# Patient Record
Sex: MALE | URBAN - METROPOLITAN AREA
[De-identification: names, ages, dates, MRNs, and addresses within clinical notes are randomized per-mention and may not be internally consistent; named-entity substitution may affect disease eponyms.]

---

## 2019-01-11 ENCOUNTER — RECORDS - HEALTHEAST (OUTPATIENT)
Dept: LAB | Facility: CLINIC | Age: 69
End: 2019-01-11

## 2019-01-11 ENCOUNTER — TRANSFERRED RECORDS (OUTPATIENT)
Dept: HEALTH INFORMATION MANAGEMENT | Facility: CLINIC | Age: 69
End: 2019-01-11

## 2019-01-11 LAB
INR PPP: 1.88 (ref 0.9–1.1)
INR PPP: 1.88 (ref 0.9–1.1)

## 2019-01-14 ENCOUNTER — NURSING HOME VISIT (OUTPATIENT)
Dept: GERIATRICS | Facility: CLINIC | Age: 69
End: 2019-01-14
Payer: MEDICARE

## 2019-01-14 VITALS
SYSTOLIC BLOOD PRESSURE: 97 MMHG | HEIGHT: 70 IN | OXYGEN SATURATION: 93 % | DIASTOLIC BLOOD PRESSURE: 53 MMHG | RESPIRATION RATE: 18 BRPM | HEART RATE: 90 BPM | TEMPERATURE: 99 F

## 2019-01-14 DIAGNOSIS — E88.3 TUMOR LYSIS SYNDROME: ICD-10-CM

## 2019-01-14 DIAGNOSIS — E83.42 HYPOMAGNESEMIA: ICD-10-CM

## 2019-01-14 DIAGNOSIS — R53.81 DEBILITY: ICD-10-CM

## 2019-01-14 DIAGNOSIS — E87.79 OTHER HYPERVOLEMIA: ICD-10-CM

## 2019-01-14 DIAGNOSIS — C91.10 CLL (CHRONIC LYMPHOCYTIC LEUKEMIA) (H): Primary | ICD-10-CM

## 2019-01-14 DIAGNOSIS — N18.9 CHRONIC KIDNEY DISEASE, UNSPECIFIED CKD STAGE: ICD-10-CM

## 2019-01-14 DIAGNOSIS — J90 PLEURAL EFFUSION ON RIGHT: ICD-10-CM

## 2019-01-14 DIAGNOSIS — J44.9 CHRONIC OBSTRUCTIVE PULMONARY DISEASE, UNSPECIFIED COPD TYPE (H): ICD-10-CM

## 2019-01-14 DIAGNOSIS — E83.39 HYPERPHOSPHATEMIA: ICD-10-CM

## 2019-01-14 DIAGNOSIS — G89.29 OTHER CHRONIC PAIN: ICD-10-CM

## 2019-01-14 DIAGNOSIS — D68.51 FACTOR V LEIDEN (H): ICD-10-CM

## 2019-01-14 DIAGNOSIS — E11.9 TYPE 2 DIABETES MELLITUS WITHOUT COMPLICATION, WITHOUT LONG-TERM CURRENT USE OF INSULIN (H): ICD-10-CM

## 2019-01-14 DIAGNOSIS — E79.0 HYPERURICEMIA: ICD-10-CM

## 2019-01-14 DIAGNOSIS — M89.8X9 BONE PAIN: ICD-10-CM

## 2019-01-14 DIAGNOSIS — E21.3 HYPERPARATHYROIDISM (H): ICD-10-CM

## 2019-01-14 DIAGNOSIS — N17.9 AKI (ACUTE KIDNEY INJURY) (H): ICD-10-CM

## 2019-01-14 DIAGNOSIS — G62.9 NEUROPATHY: ICD-10-CM

## 2019-01-14 PROCEDURE — 99310 SBSQ NF CARE HIGH MDM 45: CPT | Performed by: NURSE PRACTITIONER

## 2019-01-14 RX ORDER — ACETAMINOPHEN 325 MG/1
1000 TABLET ORAL 3 TIMES DAILY
COMMUNITY

## 2019-01-14 RX ORDER — LOPERAMIDE HCL 2 MG
2 CAPSULE ORAL PRN
COMMUNITY

## 2019-01-14 RX ORDER — CINACALCET 30 MG/1
30 TABLET, FILM COATED ORAL EVERY EVENING
COMMUNITY

## 2019-01-14 RX ORDER — PANTOPRAZOLE SODIUM 40 MG/1
40 TABLET, DELAYED RELEASE ORAL DAILY
COMMUNITY

## 2019-01-14 RX ORDER — ALBUTEROL SULFATE 90 UG/1
2 AEROSOL, METERED RESPIRATORY (INHALATION) EVERY 4 HOURS PRN
COMMUNITY

## 2019-01-14 RX ORDER — ALLOPURINOL 300 MG/1
300 TABLET ORAL DAILY
COMMUNITY

## 2019-01-14 RX ORDER — GABAPENTIN 600 MG/1
900 TABLET ORAL 3 TIMES DAILY
COMMUNITY
End: 2019-02-04

## 2019-01-14 RX ORDER — ATORVASTATIN CALCIUM 40 MG/1
40 TABLET, FILM COATED ORAL DAILY
COMMUNITY

## 2019-01-14 RX ORDER — FUROSEMIDE 40 MG
80 TABLET ORAL DAILY
COMMUNITY
End: 2019-01-22

## 2019-01-14 RX ORDER — PROCHLORPERAZINE MALEATE 10 MG
10 TABLET ORAL EVERY 6 HOURS PRN
COMMUNITY

## 2019-01-14 RX ORDER — OXYCODONE HYDROCHLORIDE 5 MG/1
10 TABLET ORAL
COMMUNITY
End: 2019-01-25

## 2019-01-14 RX ORDER — AMOXICILLIN 250 MG
1 CAPSULE ORAL AT BEDTIME
COMMUNITY

## 2019-01-14 RX ORDER — WARFARIN SODIUM 2.5 MG/1
TABLET ORAL DAILY
COMMUNITY

## 2019-01-14 NOTE — LETTER
1/14/2019        RE: Herminio Zapata  [address]  [facility] [address]        [place] [facility]  PRIMARY CARE PROVIDER AND CLINIC:  [facility], [facility] [address] / [facility] [address]  Chief Complaint   Patient presents with     Hospital F/U     [facility] Record Number:  [id number]  Place of Service where encounter took place:  [facility] [[id number]]    HPI:    Herminio Zapata is a 68 year old  (1950),admitted to the above facility from  [facility] .  Hospital stay 5 Jan 19 through 10 Demetris 19.  Admitted to this facility for  rehab, medical management and nursing care.  HPI information obtained from: facility chart records, facility staff, patient report and [facility] chart review.      Mr. Zapata is a complex 67 y/o with a PMH significant for DM II, Factor V Leiden, moderate COPD, and hyperparathyroidism whom also has CLL, current exacerbation [facility].  He has been on a Venclexta chemotherapy, noted to induce hyperglycemia, and has been experiencing bone pain in LE's and shoulders, also noted to have iatrogenic fluid overload with small pleural effusion.  Was stable/improving thus transitioned to us at the TCU for ongoing cares and management.  Of note: [facility]. [facility]'s all [facility], thus we have no EMR data/records on file for him.  Only the few notes sent via the [facility].     We met with Mr. Zapata late today as he reports [facility].  Reports he is overly tired, cantankerous and wants to be left alone if possible.  I did meet with him and he reports the above HPI.  Reports the LE swelling as much improved, reports the work of breathing much improved, reports [facility], did not feel SOB/asympmtomatic.  He is endorsing the LE and shoulder bone pain as 8/10 currently.  Is endorsing neuropathy in both hands/arms, newer since several weeks ago when he had bad swelling.  Has no  other complaints, is declining PT/OT today due to fatigue.      CODE STATUS/ADVANCE DIRECTIVES DISCUSSION:   DNR / DNI  Patient's living condition: lives alone    ALLERGIES:Allopurinol; Cimetidine; and Morphine  PAST MEDICAL HISTORY:  has no past medical history on file.  PAST SURGICAL HISTORY:  has no past surgical history on file.  FAMILY HISTORY: family history is not on file.  SOCIAL HISTORY:      Post Discharge Medication Reconciliation Status: discharge medications reconciled and changed, per note/orders (see AVS).  Current Outpatient Medications   Medication Sig Dispense Refill     acetaminophen (TYLENOL) 325 MG tablet Take 1,000 mg by mouth 3 times daily       albuterol (PROAIR HFA/PROVENTIL HFA/VENTOLIN HFA) 108 (90 Base) MCG/ACT inhaler Inhale 2 puffs into the lungs every 4 hours as needed for shortness of breath / dyspnea or wheezing       allopurinol (ZYLOPRIM) 300 MG tablet Take 300 mg by mouth daily       atorvastatin (LIPITOR) 40 MG tablet Take 40 mg by mouth daily       cholecalciferol 1000 units TABS Take 2,000 Units by mouth daily       cinacalcet (SENSIPAR) 30 MG tablet Take 30 mg by mouth every evening       diclofenac (VOLTAREN) 1 % topical gel Place 2 g onto the skin as needed for moderate pain (max 32grams per day)       furosemide (LASIX) 40 MG tablet Take 80 mg by mouth daily       gabapentin (NEURONTIN) 600 MG tablet Take 600 mg by mouth 3 times daily       loperamide (IMODIUM) 2 MG capsule Take 2 mg by mouth as needed for diarrhea (2 mg by mouth as needed for diarrhea daily, take two capsules at onset of diarrhea, then one capsule every two hours until no diarrhea for twelve hours)       Magnesium Oxide 140 MG CAPS Take 280 mg by mouth daily       metFORMIN (GLUCOPHAGE) 1000 MG tablet Take 1,000 mg by mouth 2 times daily (with meals)       oxyCODONE (ROXICODONE) 5 MG tablet Take 10 mg by mouth every 5 hours PRN       pantoprazole (PROTONIX) 40 MG EC tablet Take 40 mg by mouth daily        "prochlorperazine (COMPAZINE) 10 MG tablet Take 10 mg by mouth every 6 hours as needed for nausea or vomiting       senna-docusate (SENOKOT-S/PERICOLACE) 8.6-50 MG tablet Take 1 tablet by mouth At Bedtime       tiotropium-olodaterol 2.5-2.5 MCG/ACT AERS Inhale 2 puffs into the lungs daily       venetoclax (VENCLEXTA) 100 MG tablet CHEMOTHERAPY Take 100 mg by mouth daily       warfarin (COUMADIN) 2.5 MG tablet Take 2.5 mg by mouth daily         ROS:  7 point ROS done including, light headedness/dizziness, fever/chills, pain, Resp, CV, GI, and  and is negative other than noted in HPI.     Exam:  BP 97/53   Pulse 90   Temp 99  F (37.2  C)   Resp 18   Ht 1.778 m (5' 10\")   SpO2 93%      GENERAL APPEARANCE:  Elderly male, appears fatigued, but resting in bed, NAD, non-toxic.  ENT:  Mouth and oropharynx normal, moist mucous membranes.   RESP:  Lungs CTA.  Regular relaxed breathing effort.  No cough.   CV: S1/S2 no murmur or rubs.  Regular rhythm and rate.    ABDOMEN:   Protuberant but, soft, non-tender with active bowel sounds.  No guarding, rigidity, or rebound tenderness.  EXTREMITIES: 1+ bilateral lower extremity edema, no calf tenderness.   PSYCH: Alert and orientated, pleasant and cooperative.     Lab/Diagnostic data: None to review.     ASSESSMENT/PLAN:  CLL (chronic lymphocytic leukemia) (H)  Pleural effusion on right, small  Chronic obstructive pulmonary disease, unspecified COPD type (H), moderate  He reports SOB/CANO as resolved, no use of O2 prior to events several months ago.  Reports without oxygen all day today, no SOB/Asymptomatic.  Reports the LE swelling as much improved.   -Continues on ongoing Furosemide for now, see below.   -Continues on PTA Olodaterol q daily and PRN Albuterol.     Our notes report he's on daily Venclexta (chemotherapy), which had large side affect of edema.  Per his report[facility], but I do not see any paperwork yet to support that.    --F/u on current use of " Venclexta?   --VA notes to taper off Furosemide clinically, if he did stop Venclexta, this may be part of that.   -Reports VA f/u on 1/24.     Other hypervolemia, Iatorogenic  Noted to be Iatorogenic, he also endorses recent pneumonia.  He does have 1-2+ LE edema, no upper arm edema, no crackles.  Reports SOB resolved as noted above.   SBP's are soft, , but asymptomatic.   -Continues Furosemide as noted above, may need to quickly taper off pending clinical course.     Neuropathy  Reports newer bilateral arm/hand neuropathy since a few weeks ago, etiology unclear.  He reports he forgot to speak with [facility] about it.  Unclear if it's secondary to the chemotherapy, CLL or other.  Reports he did not have it prior.   -Will have him f[facility]u with the [facility] on bilateral hand/arm neuropathy.     Factor V Leiden (H)  Warfarin 5 mg's on T/W/F/Sa/Su and 2.5 on M/Th.   -Next INR tomorrow 1/15.     KIANA (acute kidney injury) (H)  Chronic kidney disease, unspecified CKD stage  Tumor lysis syndrome  Hyperuricemia  Hyperphosphatemia  Hypomagnesemia  Per notes sent, creatine baseline is 1.2, was recently up to 1.6, we do not have any lab data, they also note hyperuricemia, hyperphosphatemia, hypomagnesemia and tumor lysis syndrome.  No values sent, appears he did get a dose of Rasburicase, but not verified.    -Continues on Allopurinol.   -Will recheck all labs tomorrow 1/15.   -Continues on Magnesium supplement.   --Recheck at least 2/week for now.     Type 2 diabetes mellitus without complication, without long-term current use of insulin (H)  A1c noted to be 6.5 last July 2018.  Notes also indicate Metformin was 500 BID, increased to 1000 mg's BID due to hyperglycemia induced by the chemo Venclexta.  Review of BID glucoses show good daily control for now.   -Continue Metformin 1000 mg's BID.   --May need to reduce, if chemo was dc'd.   -Continue BID glucoses for now.   -Will recheck A1c for completeness.     Hyperparathyroidism (H)  No  labs to review, notes do indicate s/p parathyroidectomy in 2008.   -Continues PTA Cinacalcet.     Bone pain, lower extremities  Other chronic pain  Reports current LE bone pain as 8/10 but been up most of day, no recent use of analgesics.   -Changed Acetaminophen to TID scheduled.   -Continue with Oxycodone 10 mg's q5h PRN, which he reports works very well.     Debility  -PT/OT to eval and treat.      Orders:  -As noted above.     Total time spent with patient visit at the [facility] was 45 min including patient visit and review of past records. Greater than 50% of total time spent with counseling and coordinating care due to admission/establish new care, complex medical case, review of HPI, development of POC, patient education and review of POC with pt.     Electronically signed by:  SALLY Garcia CNP                    Sincerely,        SALLY Garcia CNP

## 2019-01-14 NOTE — PROGRESS NOTES
[place] [facility]  PRIMARY CARE PROVIDER AND CLINIC:  [facility], [facility] [address] / [facility] [address]  Chief Complaint   Patient presents with     Hospital F/U     [facility] Record Number:  [id number]  Place of Service where encounter took place:  [facility] [[id number]]    HPI:    Herminio Zapata is a 68 year old  (1950),admitted to the above facility from  [facility] .  Hospital stay 5 Jan 19 through 10 Demetris 19.  Admitted to this facility for  rehab, medical management and nursing care.  HPI information obtained from: facility chart records, facility staff, patient report and [facility] chart review.      Mr. Zapata is a complex 67 y/o with a PMH significant for DM II, Factor V Leiden, moderate COPD, and hyperparathyroidism whom also has CLL, current exacerbation and [facility].  He has been on a Venclexta chemotherapy, noted to induce hyperglycemia, and has been experiencing bone pain in LE's and shoulders, also noted to have iatrogenic fluid overload with small pleural effusion.  Was stable/improving thus transitioned to us at the TCU for ongoing cares and management.  Of note: Mr. Zapata get's all his care via the [facility], thus we have no EMR data/records on file for him.  Only the few notes sent via the [facility].     We met with Mr. Zapata late today as he reports he was at the [facility] all day.  Reports he is overly tired, cantankerous and wants to be left alone if possible.  I did meet with him and he reports the above HPI.  Reports the LE swelling as much improved, reports the work of breathing much improved, reports he was [facility] all [facility] at the [facility], did not feel SOB/asympmtomatic.  He is endorsing the LE and shoulder bone pain as 8/10 currently.  Is endorsing neuropathy in both hands/arms, newer since several weeks ago when he had bad swelling.  Has no other complaints, is declining PT/OT today due to fatigue.      CODE STATUS/ADVANCE DIRECTIVES  DISCUSSION:   DNR / DNI  Patient's living condition: lives alone    ALLERGIES:Allopurinol; Cimetidine; and Morphine  PAST MEDICAL HISTORY:  has no past medical history on file.  PAST SURGICAL HISTORY:  has no past surgical history on file.  FAMILY HISTORY: family history is not on file.  SOCIAL HISTORY:      Post Discharge Medication Reconciliation Status: discharge medications reconciled and changed, per note/orders (see AVS).  Current Outpatient Medications   Medication Sig Dispense Refill     acetaminophen (TYLENOL) 325 MG tablet Take 1,000 mg by mouth 3 times daily       albuterol (PROAIR HFA/PROVENTIL HFA/VENTOLIN HFA) 108 (90 Base) MCG/ACT inhaler Inhale 2 puffs into the lungs every 4 hours as needed for shortness of breath / dyspnea or wheezing       allopurinol (ZYLOPRIM) 300 MG tablet Take 300 mg by mouth daily       atorvastatin (LIPITOR) 40 MG tablet Take 40 mg by mouth daily       cholecalciferol 1000 units TABS Take 2,000 Units by mouth daily       cinacalcet (SENSIPAR) 30 MG tablet Take 30 mg by mouth every evening       diclofenac (VOLTAREN) 1 % topical gel Place 2 g onto the skin as needed for moderate pain (max 32grams per day)       furosemide (LASIX) 40 MG tablet Take 80 mg by mouth daily       gabapentin (NEURONTIN) 600 MG tablet Take 600 mg by mouth 3 times daily       loperamide (IMODIUM) 2 MG capsule Take 2 mg by mouth as needed for diarrhea (2 mg by mouth as needed for diarrhea daily, take two capsules at onset of diarrhea, then one capsule every two hours until no diarrhea for twelve hours)       Magnesium Oxide 140 MG CAPS Take 280 mg by mouth daily       metFORMIN (GLUCOPHAGE) 1000 MG tablet Take 1,000 mg by mouth 2 times daily (with meals)       oxyCODONE (ROXICODONE) 5 MG tablet Take 10 mg by mouth every 5 hours PRN       pantoprazole (PROTONIX) 40 MG EC tablet Take 40 mg by mouth daily       prochlorperazine (COMPAZINE) 10 MG tablet Take 10 mg by mouth every 6 hours as needed for nausea  "or vomiting       senna-docusate (SENOKOT-S/PERICOLACE) 8.6-50 MG tablet Take 1 tablet by mouth At Bedtime       tiotropium-olodaterol 2.5-2.5 MCG/ACT AERS Inhale 2 puffs into the lungs daily       venetoclax (VENCLEXTA) 100 MG tablet CHEMOTHERAPY Take 100 mg by mouth daily       warfarin (COUMADIN) 2.5 MG tablet Take 2.5 mg by mouth daily         ROS:  7 point ROS done including, light headedness/dizziness, fever/chills, pain, Resp, CV, GI, and  and is negative other than noted in HPI.     Exam:  BP 97/53   Pulse 90   Temp 99  F (37.2  C)   Resp 18   Ht 1.778 m (5' 10\")   SpO2 93%      GENERAL APPEARANCE:  Elderly male, appears fatigued, but resting in bed, NAD, non-toxic.  ENT:  Mouth and oropharynx normal, moist mucous membranes.   RESP:  Lungs CTA.  Regular relaxed breathing effort.  No cough.   CV: S1/S2 no murmur or rubs.  Regular rhythm and rate.    ABDOMEN:   Protuberant but, soft, non-tender with active bowel sounds.  No guarding, rigidity, or rebound tenderness.  EXTREMITIES: 1+ bilateral lower extremity edema, no calf tenderness.   PSYCH: Alert and orientated, pleasant and cooperative.     Lab/Diagnostic data: None to review.     ASSESSMENT/PLAN:  CLL (chronic lymphocytic leukemia) (H)  Pleural effusion on right, small  Chronic obstructive pulmonary disease, unspecified COPD type (H), moderate  He reports SOB/CANO as resolved, no use of O2 prior to events several months ago.  Reports without oxygen all day today, no SOB/Asymptomatic.  Reports the LE swelling as much improved.   -Continues on ongoing Furosemide for now, see below.   -Continues on PTA Olodaterol q daily and PRN Albuterol.     Our notes report he's on daily Venclexta (chemotherapy), which had large side affect of edema.  Per his report, [facility], but I do not see any paperwork yet to support that.    --F/u on current use of Venclexta?   --VA notes to taper off Furosemide clinically, if he did stop Venclexta, this may be " part of that.   -Reports VA f/u on 1/24.     Other hypervolemia, Iatorogenic  Noted to be Iatorogenic, he also endorses recent pneumonia.  He does have 1-2+ LE edema, no upper arm edema, no crackles.  Reports SOB resolved as noted above.   SBP's are soft, , but asymptomatic.   -Continues Furosemide as noted above, may need to quickly taper off pending clinical course.     Neuropathy  Reports newer bilateral arm/hand neuropathy since a few weeks ago, etiology unclear.  He reports he forgot to speak with [facility] about it.  Unclear if it's secondary to the chemotherapy, CLL or other.  Reports he did not have it prior.   -Will have him f/u with the [facility] on bilateral hand/arm neuropathy.     Factor V Leiden (H)  Warfarin 5 mg's on T/W/F/Sa/Su and 2.5 on M/Th.   -Next INR tomorrow 1/15.     KIANA (acute kidney injury) (H)  Chronic kidney disease, unspecified CKD stage  Tumor lysis syndrome  Hyperuricemia  Hyperphosphatemia  Hypomagnesemia  Per notes sent, creatine baseline is 1.2, was recently up to 1.6, we do not have any lab data, they also note hyperuricemia, hyperphosphatemia, hypomagnesemia and tumor lysis syndrome.  No values sent, appears he did get a dose of Rasburicase, but not verified.    -Continues on Allopurinol.   -Will recheck all labs tomorrow 1/15.   -Continues on Magnesium supplement.   --Recheck at least 2/week for now.     Type 2 diabetes mellitus without complication, without long-term current use of insulin (H)  A1c noted to be 6.5 last July 2018.  Notes also indicate Metformin was 500 BID, increased to 1000 mg's BID due to hyperglycemia induced by the chemo Venclexta.  Review of BID glucoses show good daily control for now.   -Continue Metformin 1000 mg's BID.   --May need to reduce, if chemo was dc'd.   -Continue BID glucoses for now.   -Will recheck A1c for completeness.     Hyperparathyroidism (H)  No labs to review, notes do indicate s/p parathyroidectomy in 2008.   -Continues PTA Cinacalcet.      Bone pain, lower extremities  Other chronic pain  Reports current LE bone pain as 8/10 but been up most of day, no recent use of analgesics.   -Changed Acetaminophen to TID scheduled.   -Continue with Oxycodone 10 mg's q5h PRN, which he reports works very well.     Debility  -PT/OT to eval and treat.      Orders:  -As noted above.     Total time spent with patient visit at the skilled nursing facility was 45 min including patient visit and review of past records. Greater than 50% of total time spent with counseling and coordinating care due to admission/establish new care, complex medical case, review of HPI, development of POC, patient education and review of POC with pt.     Electronically signed by:  SALLY Garcia CNP

## 2019-01-15 ENCOUNTER — RECORDS - HEALTHEAST (OUTPATIENT)
Dept: LAB | Facility: CLINIC | Age: 69
End: 2019-01-15

## 2019-01-15 LAB
ALBUMIN SERPL-MCNC: 3.4 G/DL (ref 3.5–5)
ALBUMIN SERPL-MCNC: 3.4 G/DL (ref 3.5–5)
ALP SERPL-CCNC: 77 U/L (ref 45–120)
ALT SERPL W P-5'-P-CCNC: 9 U/L (ref 0–45)
ANION GAP SERPL CALCULATED.3IONS-SCNC: 10 MMOL/L (ref 5–18)
AST SERPL W P-5'-P-CCNC: 14 U/L (ref 0–40)
BILIRUB SERPL-MCNC: 0.7 MG/DL (ref 0–1)
BUN SERPL-MCNC: 37 MG/DL (ref 8–28)
CALCIUM SERPL-MCNC: 9.9 MG/DL (ref 8.5–10.5)
CHLORIDE BLD-SCNC: 92 MMOL/L (ref 98–107)
CO2 SERPL-SCNC: 35 MMOL/L (ref 22–31)
CREAT SERPL-MCNC: 1.56 MG/DL (ref 0.7–1.3)
GFR SERPL CREATININE-BSD FRML MDRD: 44 ML/MIN/1.73M2
GLUCOSE BLD-MCNC: 141 MG/DL (ref 70–125)
HBA1C MFR BLD: 7.3 % (ref 4.2–6.1)
INR PPP: 2.61 (ref 0.9–1.1)
MAGNESIUM SERPL-MCNC: 1.5 MG/DL (ref 1.8–2.6)
POTASSIUM BLD-SCNC: 4.2 MMOL/L (ref 3.5–5)
PROT SERPL-MCNC: 5.6 G/DL (ref 6–8)
SODIUM SERPL-SCNC: 137 MMOL/L (ref 136–145)
URATE SERPL-MCNC: 8.3 MG/DL (ref 3–8)

## 2019-01-21 ENCOUNTER — RECORDS - HEALTHEAST (OUTPATIENT)
Dept: LAB | Facility: CLINIC | Age: 69
End: 2019-01-21

## 2019-01-21 LAB
ANION GAP SERPL CALCULATED.3IONS-SCNC: 7 MMOL/L (ref 5–18)
BUN SERPL-MCNC: 29 MG/DL (ref 8–28)
CALCIUM SERPL-MCNC: 10.5 MG/DL (ref 8.5–10.5)
CHLORIDE BLD-SCNC: 100 MMOL/L (ref 98–107)
CO2 SERPL-SCNC: 30 MMOL/L (ref 22–31)
CREAT SERPL-MCNC: 1.03 MG/DL (ref 0.7–1.3)
ERYTHROCYTE [DISTWIDTH] IN BLOOD BY AUTOMATED COUNT: 17.6 % (ref 11–14.5)
GFR SERPL CREATININE-BSD FRML MDRD: >60 ML/MIN/1.73M2
GLUCOSE BLD-MCNC: 160 MG/DL (ref 70–125)
HCT VFR BLD AUTO: 30.4 % (ref 40–54)
HGB BLD-MCNC: 9.1 G/DL (ref 14–18)
INR PPP: 2.77 (ref 0.9–1.1)
MAGNESIUM SERPL-MCNC: 1.2 MG/DL (ref 1.8–2.6)
MCH RBC QN AUTO: 30.4 PG (ref 27–34)
MCHC RBC AUTO-ENTMCNC: 29.9 G/DL (ref 32–36)
MCV RBC AUTO: 102 FL (ref 80–100)
PLATELET # BLD AUTO: 201 THOU/UL (ref 140–440)
PMV BLD AUTO: 11.4 FL (ref 8.5–12.5)
POTASSIUM BLD-SCNC: 4.5 MMOL/L (ref 3.5–5)
RBC # BLD AUTO: 2.99 MILL/UL (ref 4.4–6.2)
SODIUM SERPL-SCNC: 137 MMOL/L (ref 136–145)
WBC: 8.4 THOU/UL (ref 4–11)

## 2019-01-22 ENCOUNTER — NURSING HOME VISIT (OUTPATIENT)
Dept: GERIATRICS | Facility: CLINIC | Age: 69
End: 2019-01-22
Payer: MEDICARE

## 2019-01-22 VITALS
DIASTOLIC BLOOD PRESSURE: 59 MMHG | BODY MASS INDEX: 26.96 KG/M2 | RESPIRATION RATE: 18 BRPM | HEART RATE: 84 BPM | HEIGHT: 68 IN | SYSTOLIC BLOOD PRESSURE: 97 MMHG | WEIGHT: 177.9 LBS | OXYGEN SATURATION: 90 % | TEMPERATURE: 97.6 F

## 2019-01-22 DIAGNOSIS — J90 PLEURAL EFFUSION ON RIGHT: ICD-10-CM

## 2019-01-22 DIAGNOSIS — E79.0 HYPERURICEMIA: ICD-10-CM

## 2019-01-22 DIAGNOSIS — N18.9 CHRONIC KIDNEY DISEASE, UNSPECIFIED CKD STAGE: ICD-10-CM

## 2019-01-22 DIAGNOSIS — C91.10 CLL (CHRONIC LYMPHOCYTIC LEUKEMIA) (H): Primary | ICD-10-CM

## 2019-01-22 DIAGNOSIS — E83.39 HYPERPHOSPHATEMIA: ICD-10-CM

## 2019-01-22 DIAGNOSIS — R53.81 DEBILITY: ICD-10-CM

## 2019-01-22 DIAGNOSIS — E83.42 HYPOMAGNESEMIA: ICD-10-CM

## 2019-01-22 DIAGNOSIS — E87.79 OTHER HYPERVOLEMIA: ICD-10-CM

## 2019-01-22 DIAGNOSIS — G89.29 OTHER CHRONIC PAIN: ICD-10-CM

## 2019-01-22 DIAGNOSIS — E88.3 TUMOR LYSIS SYNDROME: ICD-10-CM

## 2019-01-22 DIAGNOSIS — E11.9 TYPE 2 DIABETES MELLITUS WITHOUT COMPLICATION, WITHOUT LONG-TERM CURRENT USE OF INSULIN (H): ICD-10-CM

## 2019-01-22 DIAGNOSIS — J44.9 CHRONIC OBSTRUCTIVE PULMONARY DISEASE, UNSPECIFIED COPD TYPE (H): ICD-10-CM

## 2019-01-22 DIAGNOSIS — G62.9 NEUROPATHY: ICD-10-CM

## 2019-01-22 DIAGNOSIS — E21.3 HYPERPARATHYROIDISM (H): ICD-10-CM

## 2019-01-22 DIAGNOSIS — D68.51 FACTOR V LEIDEN (H): ICD-10-CM

## 2019-01-22 DIAGNOSIS — M89.8X9 BONE PAIN: ICD-10-CM

## 2019-01-22 PROCEDURE — 99309 SBSQ NF CARE MODERATE MDM 30: CPT | Performed by: NURSE PRACTITIONER

## 2019-01-22 ASSESSMENT — MIFFLIN-ST. JEOR: SCORE: 1546.21

## 2019-01-22 NOTE — LETTER
1/22/2019        RE: Herminio Zapata  [address]  [facility] [address]        [place] [facility]    Chief Complaint   Patient presents with     RECHECK       [place] Medical Record Number:  [id number]  Place of Service where encounter took place:  [facility] [[id number]]    HPI:    Herminio Zapata is a 68 year old  (1950), who is being seen today for an episodic care visit.  HPI information obtained from: facility chart records, facility staff, patient report and [facility] chart review.    Met with Mr. Zapata today for follow up.  Mr. Zapata reports overall he is feeling a bit better, but still endorses chronic hand neuropathy and chronic lower leg bone pain, reports pain is tolerable when RN's keep pain med's on time, pain works up if they are late.  He denies any SOB/CANO despite still requiring oxygen.  [facility], has no other complaints.     ALLERGIES: Allopurinol; Cimetidine; and Morphine  Past Medical, Surgical, Family and Social History reviewed and updated in [facility].    Current Outpatient Medications   Medication Sig Dispense Refill     acetaminophen (TYLENOL) 325 MG tablet Take 1,000 mg by mouth 3 times daily       albuterol (PROAIR HFA/PROVENTIL HFA/VENTOLIN HFA) 108 (90 Base) MCG/ACT inhaler Inhale 2 puffs into the lungs every 4 hours as needed for shortness of breath / dyspnea or wheezing       allopurinol (ZYLOPRIM) 300 MG tablet Take 300 mg by mouth daily       atorvastatin (LIPITOR) 40 MG tablet Take 40 mg by mouth daily       cholecalciferol 1000 units TABS Take 2,000 Units by mouth daily       cinacalcet (SENSIPAR) 30 MG tablet Take 30 mg by mouth every evening       diclofenac (VOLTAREN) 1 % topical gel Place 2 g onto the skin as needed for moderate pain (max 32grams per day)       gabapentin (NEURONTIN) 600 MG tablet Take 600 mg by mouth 3 times daily       loperamide (IMODIUM) 2 MG capsule Take 2 mg by mouth as needed for diarrhea (2 mg  "by mouth as needed for diarrhea daily, take two capsules at onset of diarrhea, then one capsule every two hours until no diarrhea for twelve hours)       Magnesium Oxide 140 MG CAPS Take 280 mg by mouth 2 times daily        metFORMIN (GLUCOPHAGE) 1000 MG tablet Take 1,000 mg by mouth 2 times daily (with meals)       oxyCODONE (ROXICODONE) 5 MG tablet Take 10 mg by mouth every 5 hours PRN       pantoprazole (PROTONIX) 40 MG EC tablet Take 40 mg by mouth daily       prochlorperazine (COMPAZINE) 10 MG tablet Take 10 mg by mouth every 6 hours as needed for nausea or vomiting       senna-docusate (SENOKOT-S/PERICOLACE) 8.6-50 MG tablet Take 1 tablet by mouth At Bedtime       tiotropium-olodaterol 2.5-2.5 MCG/ACT AERS Inhale 2 puffs into the lungs daily       venetoclax (VENCLEXTA) 100 MG tablet CHEMOTHERAPY Take 100 mg by mouth daily       warfarin (COUMADIN) 2.5 MG tablet Take 5 mg by mouth daily        Medications reviewed:  Medications reconciled to facility chart and changes were made to reflect current medications as identified as above med list. Below are the changes that were made:   Medications stopped since last EPIC medication reconciliation:   There are no discontinued medications.    Medications started since last [facility] medication reconciliation:  No orders of the defined types were placed in this encounter.    REVIEW OF SYSTEMS:  7 point ROS done including, light headedness/dizziness, fever/chills, pain, Resp, CV, GI, and  and is negative other than noted in HPI.    Physical Exam:  BP 97/59   Pulse 84   Temp 97.6  F (36.4  C)   Resp 18   Ht 1.735 m (5' 8.3\")   Wt 80.7 kg (177 lb 14.4 oz)   SpO2 90%   BMI 26.81 kg/m        GENERAL APPEARANCE:  Elderly male, appears fatigued, but resting in bed, NAD, non-toxic.  Later was up with walker.    ENT:  Mouth and oropharynx normal, moist mucous membranes.   RESP:  Lungs  mild diminished in bilateral bases, otherwise CTA.  Regular relaxed breathing effort.  No " cough.   CV: S1/S2 no murmur or rubs.  Regular rhythm and rate.    ABDOMEN:   Protuberant but, soft, non-tender with active bowel sounds.  No guarding, rigidity, or rebound tenderness.  EXTREMITIES: No bilateral lower extremity edema, no calf tenderness.   PSYCH: Alert and orientated, pleasant and cooperative.     Recent Labs: I have personally reviewed labs, which are in facility chart.     Assessment/Plan:  CLL (chronic lymphocytic leukemia) (H)  [facility], currently he reports [facility] supplied Venclexta is on hold.    -[facility] 1[facility].     Pleural effusion on right, small  Other hypervolemia, Iatorogenic  Chronic obstructive pulmonary disease, unspecified COPD type (H), moderate  Denies any SOB/CANO but is still requiring 1 L NC.  Reports he was [facility].  Lungs mild diminished in lower lobes, no clinical s/s of pleural effusion currently on exam.  Was on Furosemide for diuresis, but he became hypotensive, weight down to 196 to 177, no LE edema, thus we stopped the Furosemide for now.  Clinically monitoring.    -Weight's 2/week.   -Continue to hold Furosemide.   -Wean O2 if possible, let SW know may need to return home with Oxygen.   -Continues PTA Olodaterol and PRN Albuterol.    Factor V Leiden (H)  INR 2.77, no overt bleeding.   -Continues Warfarin 2.5 mg on Mon/Thurs 5 mg AOD.   -INR 28 Demetris.     Type 2 diabetes mellitus without complication, without long-term current use of insulin (H)  Neuropathy  Per [facility] Venclexta can cause hyperglycemia, it is on hold.  He continues PTA Metformin at 1000 mg's BID.  Was getting daily glucose checks which showed good control.  He has declined them so they have been DC'd for now.   A1c check was 7.3 thus good control indicated.   -No daily checks for now, may need to resume if chemo resumes.   -Continue BID Metformin.     Chronic kidney disease, unspecified CKD stage  KIANA resolved, we have limited data, due to [facility] patient, but based  creatine baseline around 1.2, creatine was 1.03 when last checked.   -Continue to clinically monitor.     Tumor lysis syndrome  Hyperuricemia  Hyperphosphatemia  Hypomagnesemia  On 1/15 uric was 8.3, Mg was 1.5, Phos not done (order error).   -Mg Oxide was increased from q daily to BID.   -Continues on Allopurinol 300 mg's q daily.   -Recheck 28 Jan.     Hyperparathyroidism (H)  -Calcium was WNL.   -Continues PTA Cinacalcet.     Bone pain, lower extremities  Other chronic pain  Tolerable most of the time if analgesics stay on time.    -Continues Oxycodone 10 mg's q5h.   -Continues TID Acetaminophen, TID Gabapentin.   -Continues Diclofenac.      Debility  -Continues to work with PT/OT.     Orders:  -As noted above.     Electronically signed by  SALLY Garcia CNP                      Sincerely,        SALLY Garcia CNP

## 2019-01-22 NOTE — PROGRESS NOTES
[place] [facility]    Chief Complaint   Patient presents with     JESSICA       [place] Medical Record Number:  [id number]  Place of Service where encounter took place:  [facility] ([facility] [[id number]]    HPI:    Herminio aZpata is a 68 year old  (1950), who is being seen today for an episodic care visit.  HPI information obtained from: facility chart records, facility staff, patient report and [facility] chart review.    Met with Mr. Zapata today for follow up.  Mr. Zapata reports overall he is feeling a bit better, but still endorses chronic hand neuropathy and chronic lower leg bone pain, reports pain is tolerable when RN's keep pain med's on time, pain works up if they are late.  He denies any SOB/CANO despite still requiring oxygen.  Rep[facility], has no other complaints.     ALLERGIES: Allopurinol; Cimetidine; and Morphine  Past Medical, Surgical, Family and Social History reviewed and [facility] in [facility].    Current Outpatient Medications   Medication Sig Dispense Refill     acetaminophen (TYLENOL) 325 MG tablet Take 1,000 mg by mouth 3 times daily       albuterol (PROAIR HFA/PROVENTIL HFA/VENTOLIN HFA) 108 (90 Base) MCG/ACT inhaler Inhale 2 puffs into the lungs every 4 hours as needed for shortness of breath / dyspnea or wheezing       allopurinol (ZYLOPRIM) 300 MG tablet Take 300 mg by mouth daily       atorvastatin (LIPITOR) 40 MG tablet Take 40 mg by mouth daily       cholecalciferol 1000 units TABS Take 2,000 Units by mouth daily       cinacalcet (SENSIPAR) 30 MG tablet Take 30 mg by mouth every evening       diclofenac (VOLTAREN) 1 % topical gel Place 2 g onto the skin as needed for moderate pain (max 32grams per day)       gabapentin (NEURONTIN) 600 MG tablet Take 600 mg by mouth 3 times daily       loperamide (IMODIUM) 2 MG capsule Take 2 mg by mouth as needed for diarrhea (2 mg by mouth as needed for diarrhea daily, take two capsules at onset of diarrhea, then one  "capsule every two hours until no diarrhea for twelve hours)       Magnesium Oxide 140 MG CAPS Take 280 mg by mouth 2 times daily        metFORMIN (GLUCOPHAGE) 1000 MG tablet Take 1,000 mg by mouth 2 times daily (with meals)       oxyCODONE (ROXICODONE) 5 MG tablet Take 10 mg by mouth every 5 hours PRN       pantoprazole (PROTONIX) 40 MG EC tablet Take 40 mg by mouth daily       prochlorperazine (COMPAZINE) 10 MG tablet Take 10 mg by mouth every 6 hours as needed for nausea or vomiting       senna-docusate (SENOKOT-S/PERICOLACE) 8.6-50 MG tablet Take 1 tablet by mouth At Bedtime       tiotropium-olodaterol 2.5-2.5 MCG/ACT AERS Inhale 2 puffs into the lungs daily       venetoclax (VENCLEXTA) 100 MG tablet CHEMOTHERAPY Take 100 mg by mouth daily       warfarin (COUMADIN) 2.5 MG tablet Take 5 mg by mouth daily        Medications reviewed:  Medications reconciled to facility chart and changes were made to reflect current medications as identified as above med list. Below are the changes that were made:   Medications stopped since last EPIC medication reconciliation:   There are no discontinued medications.    Medications started since last [facility] medication reconciliation:  No orders of the defined types were placed in this encounter.    REVIEW OF SYSTEMS:  7 point ROS done including, light headedness/dizziness, fever/chills, pain, Resp, CV, GI, and  and is negative other than noted in HPI.    Physical Exam:  BP 97/59   Pulse 84   Temp 97.6  F (36.4  C)   Resp 18   Ht 1.735 m (5' 8.3\")   Wt 80.7 kg (177 lb 14.4 oz)   SpO2 90%   BMI 26.81 kg/m       GENERAL APPEARANCE:  Elderly male, appears fatigued, but resting in bed, NAD, non-toxic.  Later was up with walker.    ENT:  Mouth and oropharynx normal, moist mucous membranes.   RESP:  Lungs mild diminished in bilateral bases, otherwise CTA.  Regular relaxed breathing effort.  No cough.   CV: S1/S2 no murmur or rubs.  Regular rhythm and rate.    ABDOMEN:   Protuberant " but, soft, non-tender with active bowel sounds.  No guarding, rigidity, or rebound tenderness.  EXTREMITIES: No bilateral lower extremity edema, no calf tenderness.   PSYCH: Alert and orientated, pleasant and cooperative.     Recent Labs: I have personally reviewed labs, which are in facility chart.     Assessment/Plan:  CLL (chronic lymphocytic leukemia) (H)  [facility], currently he reports [facility] supplied Venclexta is on hold.    -[facility] f[facility] with [facility] on 1/23.     Pleural effusion on right, small  Other hypervolemia, Iatorogenic  Chronic obstructive pulmonary disease, unspecified COPD type (H), moderate  Denies any SOB/CANO but is still requiring 1 L NC.  Reports he was [facility].  Lungs mild diminished in lower lobes, no clinical s/s of pleural effusion currently on exam.  Was on Furosemide for diuresis, but he became hypotensive, weight down to 196 to 177, no LE edema, thus we stopped the Furosemide for now.  Clinically monitoring.    -Weight's 2/week.   -Continue to hold Furosemide.   -Wean O2 if possible, let SW know may need to return home with Oxygen.   -Continues PTA Olodaterol and PRN Albuterol.    Factor V Leiden (H)  INR 2.77, no overt bleeding.   -Continues Warfarin 2.5 mg on Mon/Thurs 5 mg AOD.   -INR 28 Jan.     Type 2 diabetes mellitus without complication, without long-term current use of insulin (H)  Neuropathy  Per [facility] Venclexta can cause hyperglycemia, it is on hold.  He continues PTA Metformin at 1000 mg's BID.  Was getting daily glucose checks which showed good control.  He has declined them so they have been DC'd for now.   A1c check was 7.3 thus good control indicated.   -No daily checks for now, may need to resume if chemo resumes.   -Continue BID Metformin.     Chronic kidney disease, unspecified CKD stage  KIANA resolved, we have limited data, due to [facility] patient, but based creatine baseline around 1.2, creatine was 1.03 when last checked.   -Continue to clinically  monitor.     Tumor lysis syndrome  Hyperuricemia  Hyperphosphatemia  Hypomagnesemia  On 1/15 uric was 8.3, Mg was 1.5, Phos not done (order error).   -Mg Oxide was increased from q daily to BID.   -Continues on Allopurinol 300 mg's q daily.   -Recheck 28 Jan.     Hyperparathyroidism (H)  -Calcium was WNL.   -Continues PTA Cinacalcet.     Bone pain, lower extremities  Other chronic pain  Tolerable most of the time if analgesics stay on time.    -Continues Oxycodone 10 mg's q5h.   -Continues TID Acetaminophen, TID Gabapentin.   -Continues Diclofenac.      Debility  -Continues to work with PT/OT.     Orders:  -As noted above.     Electronically signed by  SALLY Garcia CNP

## 2019-01-25 ENCOUNTER — RECORDS - HEALTHEAST (OUTPATIENT)
Dept: LAB | Facility: CLINIC | Age: 69
End: 2019-01-25

## 2019-01-25 DIAGNOSIS — G89.29 OTHER CHRONIC PAIN: Primary | ICD-10-CM

## 2019-01-25 RX ORDER — OXYCODONE HYDROCHLORIDE 5 MG/1
10 TABLET ORAL SEE ADMIN INSTRUCTIONS
Qty: 60 TABLET | Refills: 0 | Status: SHIPPED | OUTPATIENT
Start: 2019-01-25 | End: 2019-01-29

## 2019-01-28 LAB
INR PPP: 3.13 (ref 0.9–1.1)
MAGNESIUM SERPL-MCNC: 1.5 MG/DL (ref 1.8–2.6)
PHOSPHATE SERPL-MCNC: 3.6 MG/DL (ref 2.5–4.5)

## 2019-01-29 ENCOUNTER — RECORDS - HEALTHEAST (OUTPATIENT)
Dept: LAB | Facility: CLINIC | Age: 69
End: 2019-01-29

## 2019-01-29 ENCOUNTER — NURSING HOME VISIT (OUTPATIENT)
Dept: GERIATRICS | Facility: CLINIC | Age: 69
End: 2019-01-29
Payer: MEDICARE

## 2019-01-29 VITALS
RESPIRATION RATE: 16 BRPM | OXYGEN SATURATION: 90 % | BODY MASS INDEX: 26.18 KG/M2 | DIASTOLIC BLOOD PRESSURE: 73 MMHG | WEIGHT: 173.7 LBS | TEMPERATURE: 97.3 F | SYSTOLIC BLOOD PRESSURE: 116 MMHG | HEART RATE: 94 BPM

## 2019-01-29 DIAGNOSIS — M89.8X9 BONE PAIN: Primary | ICD-10-CM

## 2019-01-29 DIAGNOSIS — R60.9 DEPENDENT EDEMA: ICD-10-CM

## 2019-01-29 DIAGNOSIS — G89.29 OTHER CHRONIC PAIN: ICD-10-CM

## 2019-01-29 DIAGNOSIS — N17.9 AKI (ACUTE KIDNEY INJURY) (H): ICD-10-CM

## 2019-01-29 DIAGNOSIS — G62.9 NEUROPATHY: ICD-10-CM

## 2019-01-29 LAB — INR PPP: 2.62 (ref 0.9–1.1)

## 2019-01-29 PROCEDURE — 99310 SBSQ NF CARE HIGH MDM 45: CPT | Performed by: FAMILY MEDICINE

## 2019-01-29 RX ORDER — OXYCODONE HYDROCHLORIDE 5 MG/1
10 TABLET ORAL SEE ADMIN INSTRUCTIONS
Qty: 60 TABLET | Refills: 0 | Status: SHIPPED | OUTPATIENT
Start: 2019-01-29 | End: 2019-01-29

## 2019-01-29 RX ORDER — OXYCODONE HYDROCHLORIDE 5 MG/1
10 TABLET ORAL SEE ADMIN INSTRUCTIONS
Qty: 60 TABLET | Refills: 0 | Status: SHIPPED | OUTPATIENT
Start: 2019-01-29 | End: 2019-01-31

## 2019-01-29 NOTE — LETTER
1/29/2019        RE: Herminio Zapata  [address]  [facility] [address]        [place] [facility]  PRIMARY CARE PROVIDER AND CLINIC:  [facility] [address] / [facility] [address]  Chief Complaint   Patient presents with     Hospital F/U     [place] Medical Record Number:  [id number]  Place of Service where encounter took place:  [facility] [[id number]]    HPI:    Herminio Zapata is a 68 year old  (1950),admitted to the above facility from  [facility] .  Hospital stay 1/5/19 through 1/10/19.    Mr. Zapata is a complex 67 y/o with a PMH significant for DM II, Factor V Leiden, moderate COPD, and hyperparathyroidism whom also has CLL, current exacerbation and [facility].  He has been on a Venclexta chemotherapy, noted to induce hyperglycemia, and has been experiencing bone pain in LE's and shoulders, also noted to have iatrogenic fluid overload with small pleural effusion.  Was stable/improving thus transitioned to us at the [facility]CU for ongoing cares and management.  Of note: Mr. Zapata get's all [facility], thus we have no EMR data/records on file for him.  Only the few notes sent via the [facility].     Admitted to this facility for  rehab, medical management and nursing care.  HPI information obtained from: patient report and [facility] chart review.  Current issues are:      Bone pain, lower extremities  Bone pain in R shoulder and bilateral legs.  Pt says that his bone pain improves from 7/10 to 5/10 in response to oxycodone.  He wants it scheduled evry 5 hours because he so often gets I tlater because of delays in getting prn meds.    Neuropathy  Began suddenly about a month ago, dosnt really change with the oxycodone.  He says he has mentioned it to his oncologist and is not getting help.  He plans to make an appt to be seen in [facility].      Dependent edema  Initially had weeping fluid, now much better, minimal  swelling.    KIANA (acute kidney injury) (H)  Cr 1.1,   BUN 27, K 4.7.      CODE STATUS/ADVANCE DIRECTIVES DISCUSSION:   DNR / DNI  Patient's living condition: lives alone    ALLERGIES:Allopurinol; Cimetidine; and Morphine  PAST MEDICAL HISTORY:  has no past medical history on file.  PAST SURGICAL HISTORY:  has no past surgical history on file.  FAMILY HISTORY: family history is not on file.  SOCIAL HISTORY:      Post Discharge Medication Reconciliation Status: medication reconcilation previously completed during another office visit.  Current Outpatient Medications   Medication Sig Dispense Refill     acetaminophen (TYLENOL) 325 MG tablet Take 1,000 mg by mouth 3 times daily       albuterol (PROAIR HFA/PROVENTIL HFA/VENTOLIN HFA) 108 (90 Base) MCG/ACT inhaler Inhale 2 puffs into the lungs every 4 hours as needed for shortness of breath / dyspnea or wheezing       allopurinol (ZYLOPRIM) 300 MG tablet Take 300 mg by mouth daily       atorvastatin (LIPITOR) 40 MG tablet Take 40 mg by mouth daily       cholecalciferol 1000 units TABS Take 2,000 Units by mouth daily       cinacalcet (SENSIPAR) 30 MG tablet Take 30 mg by mouth every evening       diclofenac (VOLTAREN) 1 % topical gel Place 2 g onto the skin as needed for moderate pain (max 32grams per day)       gabapentin (NEURONTIN) 600 MG tablet Take 600 mg by mouth 3 times daily       loperamide (IMODIUM) 2 MG capsule Take 2 mg by mouth as needed for diarrhea (2 mg by mouth as needed for diarrhea daily, take two capsules at onset of diarrhea, then one capsule every two hours until no diarrhea for twelve hours)       Magnesium Oxide 140 MG CAPS Take 280 mg by mouth 3 times daily        metFORMIN (GLUCOPHAGE) 1000 MG tablet Take 1,000 mg by mouth 2 times daily (with meals)       oxyCODONE (ROXICODONE) 5 MG tablet Take 2 tablets (10 mg) by mouth See Admin Instructions Take 10 mg's q5 hours PRN for pain. 60 tablet 0     pantoprazole (PROTONIX) 40 MG EC tablet Take 40 mg by  mouth daily       prochlorperazine (COMPAZINE) 10 MG tablet Take 10 mg by mouth every 6 hours as needed for nausea or vomiting       senna-docusate (SENOKOT-S/PERICOLACE) 8.6-50 MG tablet Take 1 tablet by mouth At Bedtime       tiotropium-olodaterol 2.5-2.5 MCG/ACT AERS Inhale 2 puffs into the lungs daily       venetoclax (VENCLEXTA) 100 MG tablet CHEMOTHERAPY Take 100 mg by mouth daily       warfarin (COUMADIN) 2.5 MG tablet Take by mouth daily Take as directed per INR         ROS:  10 point ROS of systems including Constitutional, Eyes, Respiratory, Cardiovascular, Gastroenterology, Genitourinary, Integumentary, Musculoskeletal, Psychiatric were all negative except for pertinent positives noted in my HPI.    Exam:  /73   Pulse 94   Temp 97.3  F (36.3  C)   Resp 16   Wt 78.8 kg (173 lb 11.2 oz)   SpO2 90%   BMI 26.18 kg/m     GENERAL APPEARANCE:  Alert, oriented, appears ill, cooperative  ENT:  Mouth and posterior oropharynx normal, moist mucous membranes, normal hearing acuity, oropharynx clear  EYES:  Conjunctiva and lids normal  NECK:  No adenopathy,masses or thyromegaly  RESP:  respiratory effort and palpation of chest normal, diminished breath sounds diffusely  CV:  Palpation and auscultation of heart done , no edema, grade 2/6 murmur  CHEST (BREASTS):  normal chest wall  ABDOMEN:  sl tender inguinal adenopathy, no guarding or rebound  M/S:   diffuse myopenia, no deformities.  SKIN:  hyperpigmented circular areas on shins bilaterally  NEURO:   Cranial nerves 2-12 are normal tested and grossly at patient's baseline    Lab/Diagnostic data:  No recent labs.    ASSESSMENT/PLAN:  (M89.8X9) Bone pain, lower extremities  (primary encounter diagnosis)  Comment: Unclear whether this is 2/2 arthritis (though no deformities), inactivity, hyperparathyroidism.  Unlikely that opiates will fully ameliorate his pain.  Plan: schedule pain meds q5 hr  Agree with pain consult.    (G62.9) Neuropathy  Comment: Possibly  2/2 chemotherapy, DM.  Currently on 1800mg gabapentin, probably a maximal dose given his CKD.  Plan: Continue gabapentin, agree with pain consult.    (R60.9) Dependent edema  Comment: Probably 2/2 positionibng and CKD  Plan: Continue leg elevation.    (N17.9) KIANA (acute kidney injury) (H)  Comment: improved   Plan: avoid nephropatihic drugs.    Orders:  Schedule oxycodone.    Total time spent with patient visit at the skilled nursing facility was 45 min including patient visit and review of past records. Greater than 50% of total time spent with counseling and coordinating care due to complexity of care and multiple sx.    Electronically signed by:  Electronically signed by Monica Victoria  on January 31, 2019 7:33 PM                    Sincerely,        Monica Victoria MD

## 2019-01-29 NOTE — PROGRESS NOTES
[place] [facility]  PRIMARY CARE PROVIDER AND CLINIC:  [facility] [address] / [facility] [address]  Chief Complaint   Patient presents with     Hospital F/U     [facility] Record Number:  [id number]  Place of Service where encounter took place:  [facility] [[id number]]    HPI:    Herminio Zapata is a 68 year old  (1950),admitted to the above facility from  [facility] .  Hospital stay 1/5/19 through 1/10/19.    Mr. Zapata is a complex 67 y/o with a PMH significant for DM II, Factor V Leiden, moderate COPD, and hyperparathyroidism whom also has CLL, current exacerbation [facility].  He has been on a Venclexta chemotherapy, noted to induce hyperglycemia, and has been experiencing bone pain in LE's and shoulders, also noted to have iatrogenic fluid overload with small pleural effusion.  Was stable/improving thus transitioned to us at the TCU for ongoing cares and management.  Of note: [facility]. Zapata [facility]'s all [facility], thus we have no EMR data/records on file for him.  Only the few notes [facility] via the [facility].     Admitted to this facility for  rehab, medical management and nursing care.  HPI information obtained from: patient report and [facility] chart review.  Current issues are:      Bone pain, lower extremities  Bone pain in R shoulder and bilateral legs.  Pt says that his bone pain improves from 7/10 to 5/10 in response to oxycodone.  He wants it scheduled evry 5 hours because he so often gets I tlater because of delays in getting prn meds.    Neuropathy  Began suddenly about a month ago, dosnt really change with the oxycodone.  He says he has mentioned it to his oncologist and is not getting help.  He plans to make an appt to be seen in Pain Clinic.      Dependent edema  Initially had weeping fluid, now much better, minimal swelling.    KIANA (acute kidney injury) (H)  Cr 1.1,   BUN 27, K 4.7.      CODE STATUS/ADVANCE  DIRECTIVES DISCUSSION:   DNR / DNI  Patient's living condition: lives alone    ALLERGIES:Allopurinol; Cimetidine; and Morphine  PAST MEDICAL HISTORY:  has no past medical history on file.  PAST SURGICAL HISTORY:  has no past surgical history on file.  FAMILY HISTORY: family history is not on file.  SOCIAL HISTORY:      Post Discharge Medication Reconciliation Status: medication reconcilation previously completed during another office visit.  Current Outpatient Medications   Medication Sig Dispense Refill     acetaminophen (TYLENOL) 325 MG tablet Take 1,000 mg by mouth 3 times daily       albuterol (PROAIR HFA/PROVENTIL HFA/VENTOLIN HFA) 108 (90 Base) MCG/ACT inhaler Inhale 2 puffs into the lungs every 4 hours as needed for shortness of breath / dyspnea or wheezing       allopurinol (ZYLOPRIM) 300 MG tablet Take 300 mg by mouth daily       atorvastatin (LIPITOR) 40 MG tablet Take 40 mg by mouth daily       cholecalciferol 1000 units TABS Take 2,000 Units by mouth daily       cinacalcet (SENSIPAR) 30 MG tablet Take 30 mg by mouth every evening       diclofenac (VOLTAREN) 1 % topical gel Place 2 g onto the skin as needed for moderate pain (max 32grams per day)       gabapentin (NEURONTIN) 600 MG tablet Take 600 mg by mouth 3 times daily       loperamide (IMODIUM) 2 MG capsule Take 2 mg by mouth as needed for diarrhea (2 mg by mouth as needed for diarrhea daily, take two capsules at onset of diarrhea, then one capsule every two hours until no diarrhea for twelve hours)       Magnesium Oxide 140 MG CAPS Take 280 mg by mouth 3 times daily        metFORMIN (GLUCOPHAGE) 1000 MG tablet Take 1,000 mg by mouth 2 times daily (with meals)       oxyCODONE (ROXICODONE) 5 MG tablet Take 2 tablets (10 mg) by mouth See Admin Instructions Take 10 mg's q5 hours PRN for pain. 60 tablet 0     pantoprazole (PROTONIX) 40 MG EC tablet Take 40 mg by mouth daily       prochlorperazine (COMPAZINE) 10 MG tablet Take 10 mg by mouth every 6 hours  as needed for nausea or vomiting       senna-docusate (SENOKOT-S/PERICOLACE) 8.6-50 MG tablet Take 1 tablet by mouth At Bedtime       tiotropium-olodaterol 2.5-2.5 MCG/ACT AERS Inhale 2 puffs into the lungs daily       venetoclax (VENCLEXTA) 100 MG tablet CHEMOTHERAPY Take 100 mg by mouth daily       warfarin (COUMADIN) 2.5 MG tablet Take by mouth daily Take as directed per INR         ROS:  10 point ROS of systems including Constitutional, Eyes, Respiratory, Cardiovascular, Gastroenterology, Genitourinary, Integumentary, Musculoskeletal, Psychiatric were all negative except for pertinent positives noted in my HPI.    Exam:  /73   Pulse 94   Temp 97.3  F (36.3  C)   Resp 16   Wt 78.8 kg (173 lb 11.2 oz)   SpO2 90%   BMI 26.18 kg/m    GENERAL APPEARANCE:  Alert, oriented, appears ill, cooperative  ENT:  Mouth and posterior oropharynx normal, moist mucous membranes, normal hearing acuity, oropharynx clear  EYES:  Conjunctiva and lids normal  NECK:  No adenopathy,masses or thyromegaly  RESP:  respiratory effort and palpation of chest normal, diminished breath sounds diffusely  CV:  Palpation and auscultation of heart done , no edema, grade 2/6 murmur  CHEST (BREASTS):  normal chest wall  ABDOMEN:  sl tender inguinal adenopathy, no guarding or rebound  M/S:   diffuse myopenia, no deformities.  SKIN:  hyperpigmented circular areas on shins bilaterally  NEURO:   Cranial nerves 2-12 are normal tested and grossly at patient's baseline    Lab/Diagnostic data:  No recent labs.    ASSESSMENT/PLAN:  (M89.8X9) Bone pain, lower extremities  (primary encounter diagnosis)  Comment: Unclear whether this is 2/2 arthritis (though no deformities), inactivity, hyperparathyroidism.  Unlikely that opiates will fully ameliorate his pain.  Plan: schedule pain meds q5 hr  Agree with pain consult.    (G62.9) Neuropathy  Comment: Possibly 2/2 chemotherapy, DM.  Currently on 1800mg gabapentin, probably a maximal dose given his  CKD.  Plan: Continue gabapentin, agree with pain consult.    (R60.9) Dependent edema  Comment: Probably 2/2 positionibng and CKD  Plan: Continue leg elevation.    (N17.9) KIANA (acute kidney injury) (H)  Comment: improved   Plan: avoid nephropatihic drugs.    Orders:  Schedule oxycodone.    Total time spent with patient visit at the [facility] nursing [facility] was 45 min including patient visit and review of past records. Greater than 50% of total time spent with counseling and coordinating care due to complexity of care and multiple sx.    Electronically signed by:  Electronically signed by Monica Victoria  on January 31, 2019 7:33 PM

## 2019-01-31 DIAGNOSIS — G89.29 OTHER CHRONIC PAIN: ICD-10-CM

## 2019-01-31 RX ORDER — OXYCODONE HYDROCHLORIDE 5 MG/1
10 TABLET ORAL EVERY 4 HOURS PRN
Qty: 60 TABLET | Refills: 0 | Status: SHIPPED | OUTPATIENT
Start: 2019-01-31 | End: 2019-02-04

## 2019-02-01 ENCOUNTER — RECORDS - HEALTHEAST (OUTPATIENT)
Dept: LAB | Facility: CLINIC | Age: 69
End: 2019-02-01

## 2019-02-01 LAB — INR PPP: 1.67 (ref 0.9–1.1)

## 2019-02-04 ENCOUNTER — DISCHARGE SUMMARY NURSING HOME (OUTPATIENT)
Dept: GERIATRICS | Facility: CLINIC | Age: 69
End: 2019-02-04
Payer: MEDICARE

## 2019-02-04 VITALS
SYSTOLIC BLOOD PRESSURE: 128 MMHG | DIASTOLIC BLOOD PRESSURE: 62 MMHG | WEIGHT: 178.6 LBS | TEMPERATURE: 98 F | OXYGEN SATURATION: 98 % | HEIGHT: 68 IN | HEART RATE: 65 BPM | BODY MASS INDEX: 27.07 KG/M2 | RESPIRATION RATE: 17 BRPM

## 2019-02-04 DIAGNOSIS — N17.9 AKI (ACUTE KIDNEY INJURY) (H): ICD-10-CM

## 2019-02-04 DIAGNOSIS — E87.79 OTHER HYPERVOLEMIA: ICD-10-CM

## 2019-02-04 DIAGNOSIS — G62.9 NEUROPATHY: ICD-10-CM

## 2019-02-04 DIAGNOSIS — N18.9 CHRONIC KIDNEY DISEASE, UNSPECIFIED CKD STAGE: ICD-10-CM

## 2019-02-04 DIAGNOSIS — C91.10 CLL (CHRONIC LYMPHOCYTIC LEUKEMIA) (H): Primary | ICD-10-CM

## 2019-02-04 DIAGNOSIS — J44.9 CHRONIC OBSTRUCTIVE PULMONARY DISEASE, UNSPECIFIED COPD TYPE (H): ICD-10-CM

## 2019-02-04 DIAGNOSIS — E88.3 TUMOR LYSIS SYNDROME: ICD-10-CM

## 2019-02-04 DIAGNOSIS — D68.51 FACTOR V LEIDEN (H): ICD-10-CM

## 2019-02-04 DIAGNOSIS — E11.9 TYPE 2 DIABETES MELLITUS WITHOUT COMPLICATION, WITHOUT LONG-TERM CURRENT USE OF INSULIN (H): ICD-10-CM

## 2019-02-04 DIAGNOSIS — R53.81 DEBILITY: ICD-10-CM

## 2019-02-04 DIAGNOSIS — G89.29 OTHER CHRONIC PAIN: ICD-10-CM

## 2019-02-04 DIAGNOSIS — M89.8X9 BONE PAIN: ICD-10-CM

## 2019-02-04 DIAGNOSIS — J90 PLEURAL EFFUSION ON RIGHT: ICD-10-CM

## 2019-02-04 PROCEDURE — 99316 NF DSCHRG MGMT 30 MIN+: CPT | Performed by: NURSE PRACTITIONER

## 2019-02-04 RX ORDER — OXYCODONE HYDROCHLORIDE 5 MG/1
10 TABLET ORAL EVERY 4 HOURS PRN
Qty: 80 TABLET | Refills: 0 | Status: SHIPPED | OUTPATIENT
Start: 2019-02-04 | End: 2019-02-11

## 2019-02-04 RX ORDER — GABAPENTIN 300 MG/1
900 CAPSULE ORAL 3 TIMES DAILY
COMMUNITY

## 2019-02-04 ASSESSMENT — MIFFLIN-ST. JEOR: SCORE: 1549.38

## 2019-02-04 NOTE — LETTER
2/4/2019        RE: Herminio Zapata  [address]  [facility] [address]          [place] [facility] DISCHARGE SUMMARY    PATIENT'S NAME: Herminio Zapata  YOB: 1950  MEDICAL RECORD NUMBER:  [id number]  Place of Service where encounter took place:  [facility] [[id number]]    PRIMARY CARE PROVIDER AND CLINIC RESPONSIBLE AFTER TRANSFER: [facility] [place] [address] / [facility] [address]    TRANSFERRING PROVIDERS: SALLY Garcia CNP, Dr. Raquel MD  DATE OF SNF ADMISSION:  January / 10 / 2019  DATE OF SNF (anticipated) DISCHARGE: February / 06-09 (pt choice)/ 2019  DISCHARGE DISPOSITION: Non-[facility] Provider   RECENT HOSPITALIZATION/ED:  [facility]  stay 1/5/19 to 1/10/19.     CODE STATUS/ADVANCE DIRECTIVES DISCUSSION:   DNR / DNI     Allergies   Allergen Reactions     Allopurinol      Cimetidine      Morphine      Condition on Discharge:  Stable.  Function:  250 ft with 2ww  Cognitive Scores: BIMS 13/15 and PHQ9 00/27    Equipment: walker    DISCHARGE DIAGNOSIS:   1. CLL (chronic lymphocytic leukemia) (H)    2. Tumor lysis syndrome    3. Pleural effusion on right, small    4. Other hypervolemia, Iatorogenic    5. Other chronic pain    6. Bone pain, lower extremities    7. Neuropathy    8. KIANA (acute kidney injury) (H)    9. Chronic kidney disease, unspecified CKD stage    10. Chronic obstructive pulmonary disease, unspecified COPD type (H), moderate    11. Type 2 diabetes mellitus without complication, without long-term current use of insulin (H)    12. Factor V Leiden (H)    13. Debility        HPI Nursing Facility Course:  HPI information obtained from: facility chart records, facility staff, patient report and [facility] chart review.    Mr. Zapata is a complex 67 y/o with a PMH significant for DM II, Factor V Leiden, moderate COPD, and hyperparathyroidism whom also has CLL, current exacerbation and [facility] for  [facility].  He has been on a Venclexta chemotherapy, noted to induce hyperglycemia, and has been experiencing bone pain in LE's and shoulders, also noted to have iatrogenic fluid overload with small pleural effusion.  Was stable/improving thus transitioned to us at the TCU for ongoing cares and PT/OT.  Of note: Mr. Zapata get's all his care via the [facility], thus we have no EMR data/records on file for him.  Only the few notes sent via the [facility].     While at the TCU Mr. Zapata went back to the [facility] frequently/weekly.  He reports the VA stopped his Venclexta.  He has been slowly improving.  In regards to his COPD/pleural effusion, he reports respiratory status much improved, not currently requiring oxygen.  His iatrogenic fluid overload is resolved.  It was noted Venclexta can cause hyperglycemia, but since he stopped it, he has declined any glucose checks.  He has been more alert and did well from a PT/OT standpoint.  He was to 'DC home last week but due to the extreme weather, we waited, and now plans to 'DC some time this week, pending when he can get everything aligned.      In meeting with Mr. Zapata, he's been reporting he wants to return home for over a week. He denies any respiratory issues, reports his chronic pain improved with the new analgesic changes.  Reports ongoing bilateral hand and foot neuropathy as his biggest issue.  No other complaints.  Reports he have VA f/u on 2/7.      CLL (chronic lymphocytic leukemia) (H)  Tumor lysis syndrome  Reports the VA has him off of Venclexta (chemotherapy) for now.   When last reviewed, tumor lysis appeared stable.   -Labs per VA.   -Reports f/u on 2/7 with VA.   -Continues on Allopurinol.     Chronic obstructive pulmonary disease, unspecified COPD type (H), moderate  Pleural effusion on right, small  Does have mild RLL crackles on exam, but doing well on RA.  Denies any respiratory complaints.  Reports he has oxygen at home if needed, but has not needed here.   -Further monitoring per  VA.   -Continues on daily Olodaterol and PRN Albuterol.     Other hypervolemia, Iatorogenic  Did arrive to TCU on Furosemide but due to resolution of edema, improved weight, improved respiratory status, this was DC'd, considered resolved.   Weight was 196 lbs on admission to TCU, was down to 176.1 on 4 Feb.   -Further monitoring per VA.     Other chronic pain  Bone pain, lower extremities  Neuropathy  Ongoing but improved.  Seen by the [facility] on 31 Jan.   -They increased Gabapentin to 900 mg's TID.   -They increased Oxycodone to 10 mg's q4h PRN for pain.   -We continue Acetaminophen at 1000 mg's TID.   -Further monitoring per VA.     KIANA (acute kidney injury) (H)  Chronic kidney disease, unspecified CKD stage  We have limited labs from the VA, suspect creatine baseline is around 1.2.   -Further monitoring per VA.     Type 2 diabetes mellitus without complication, without long-term current use of insulin (H)  A1c noted to be 6.5 in January 2019.  Was started on Metformin 1000 mg's BID for DM II and the Venclexta.   Has not allowed glucose checks for us.  If no longer going to be on Venclexta, may need to reduce Metformin to prevent over treatment.   -VA to continue to monitor, consider reducing Metformin.     Factor V Leiden (H)  On Warfarin with erratic INR's.  We have continued his home dose of Warfarin 2.5 mg's on Mon and Thursday's and 5 mg's all other days.  INR has ranged 1.67 to 3.13 on this dose.    -Next INR due today 2/4, but not back yet due to weather conditions.   -Would recommend VA check INR 2/7 and take over management.     Debility  -Will 'DC with home care.   -See below for F2F.     PAST MEDICAL HISTORY:  has no past medical history on file.    DISCHARGE MEDICATIONS:  Current Outpatient Medications   Medication Sig Dispense Refill     acetaminophen (TYLENOL) 325 MG tablet Take 1,000 mg by mouth 3 times daily       albuterol (PROAIR HFA/PROVENTIL HFA/VENTOLIN HFA) 108 (90 Base) MCG/ACT inhaler Inhale 2  puffs into the lungs every 4 hours as needed for shortness of breath / dyspnea or wheezing       allopurinol (ZYLOPRIM) 300 MG tablet Take 300 mg by mouth daily       atorvastatin (LIPITOR) 40 MG tablet Take 40 mg by mouth daily       cholecalciferol 1000 units TABS Take 2,000 Units by mouth daily       cinacalcet (SENSIPAR) 30 MG tablet Take 30 mg by mouth every evening       diclofenac (VOLTAREN) 1 % topical gel Place 2 g onto the skin as needed for moderate pain (max 32grams per day)       gabapentin (NEURONTIN) 300 MG capsule Take 900 mg by mouth 3 times daily       loperamide (IMODIUM) 2 MG capsule Take 2 mg by mouth as needed for diarrhea (2 mg by mouth as needed for diarrhea daily, take two capsules at onset of diarrhea, then one capsule every two hours until no diarrhea for twelve hours)       Magnesium Oxide 140 MG CAPS Take 280 mg by mouth 3 times daily        metFORMIN (GLUCOPHAGE) 1000 MG tablet Take 1,000 mg by mouth 2 times daily (with meals)       oxyCODONE (ROXICODONE) 5 MG tablet Take 2 tablets (10 mg) by mouth every 4 hours as needed for pain 60 tablet 0     pantoprazole (PROTONIX) 40 MG EC tablet Take 40 mg by mouth daily       prochlorperazine (COMPAZINE) 10 MG tablet Take 10 mg by mouth every 6 hours as needed for nausea or vomiting       senna-docusate (SENOKOT-S/PERICOLACE) 8.6-50 MG tablet Take 1 tablet by mouth At Bedtime       tiotropium-olodaterol 2.5-2.5 MCG/ACT AERS Inhale 2 puffs into the lungs daily       venetoclax (VENCLEXTA) 100 MG tablet CHEMOTHERAPY Take 100 mg by mouth daily       warfarin (COUMADIN) 2.5 MG tablet Take by mouth daily Take as directed per INR         MEDICATION CHANGES/RATIONALE:   None    Controlled medications sent with patient:   Medication: PRN Oxycodone , approximately 60 tabs given to patient at the time of discharge to take home     ROS:    7 point ROS done including, light headedness/dizziness, fever/chills, pain, Resp, CV, GI, and  and is negative other  "than noted in HPI.      Physical Exam:   Vitals: /62   Pulse 65   Temp 98  F (36.7  C)   Resp 17   Ht 1.735 m (5' 8.3\")   Wt 81 kg (178 lb 9.6 oz)   SpO2 98%   BMI 26.92 kg/m     BMI= Body mass index is 26.92 kg/m .    GENERAL APPEARANCE:  Elderly male ambulating about room independently, NAD, non-toxic.      ENT:  Mouth and oropharynx normal, moist mucous membranes.   RESP:  Lungs  with fine crackles in RLL, diminished in LLL, otherwise CTA.  Regular relaxed breathing effort.  No cough.   CV: 2/6 systolic murmur heard thoughout/S2.  Regular rhythm and rate.    ABDOMEN:   Protuberant but, soft, non-tender with active bowel sounds.  No guarding, rigidity, or rebound tenderness.  EXTREMITIES: No bilateral lower extremity edema, no calf tenderness.   PSYCH: Alert and orientated, pleasant and cooperative.     DISCHARGE PLAN:  Home with home health care.  Patient instructed to follow-up with:  PCP in 7 days      [facility] scheduled appointments:  No future appointments.    MTM referral needed and placed by this provider: No    Pending labs:  None    SNF labs: None      Discharge Treatments: None    TOTAL DISCHARGE TIME:   Greater than 30 minutes     Electronically signed by:  SALLY Garcia CNP       Documentation of Face to Face and Certification for Home Health Services    I certify that patient: Herminio Zapata is under my care and that I, or a nurse practitioner or physician's assistant working with me, had a face-to-face encounter that meets the physician face-to-face encounter requirements with this patient on: 4 Feb 19.    This encounter with the patient was in whole, or in part, for the following medical condition, which is the primary reason for home health care: CLL, Debility.      I certify that, based on my findings, the following services are medically necessary home health services: Nursing, Occupational Therapy, Physical Therapy and HHA.    My clinical findings support the need for the " above services because: Nurse is needed: To provide assessment and oversight required in the home to assure adherence to the medical plan due to: Debility and CLL. .., Occupational Therapy Services are needed to assess and treat cognitive ability and address ADL safety due to impairment in Debility and CLL. . and Physical Therapy Services are needed to assess and treat the following functional impairments: Debility and CLL. .    Further, I certify that my clinical findings support that this patient is homebound (i.e. absences from home require considerable and taxing effort and are for medical reasons or [employer] services or infrequently or of short duration when for other reasons) because: Requires assistance of another person or specialized equipment to access medical services because patient: Requires supervision of another for safe transfer...    Based on the above findings. I certify that this patient is confined to the home and needs intermittent skilled nursing care, physical therapy and/or speech therapy.  The patient is under my care, and I have initiated the establishment of the plan of care.  This patient will be followed by a physician who will periodically review the plan of care.  Physician/Provider to provide follow up care: [facility], [facility]    Attending hospital physician (the Medicare certified [facility] provider):   Electronically signed by  SALLY Daley CNP  [facility]    Physician Signature: See electronic signature associated with these discharge orders.  Date: 2/4/2019        Sincerely,        SALLY Garcia CNP

## 2019-02-04 NOTE — PROGRESS NOTES
[facility] DISCHARGE SUMMARY    PATIENT'S NAME: Herminio Zapata  YOB: 1950  MEDICAL RECORD NUMBER:  [id number]  Place of Service where encounter took place:  [facility] ([facility]) [[id number]]    PRIMARY CARE PROVIDER AND CLINIC RESPONSIBLE AFTER TRANSFER: [facility], [facility] [address] / [facility] [address]    TRANSFERRING PROVIDERS: SALLY Garcia CNP, Dr. Raquel MD  DATE OF SNF ADMISSION:  January / 10 / 2019  DATE OF SNF (anticipated) DISCHARGE: February / 06-09 (pt choice)/ 2019  DISCHARGE DISPOSITION: Non-FMG Provider   RECENT HOSPITALIZATION/ED:  [facility]  stay 1/5/19 to 1/10/19.     CODE STATUS/ADVANCE DIRECTIVES DISCUSSION:   DNR / DNI     Allergies   Allergen Reactions     Allopurinol      Cimetidine      Morphine      Condition on Discharge:  Stable.  Function:  250 ft with 2ww  Cognitive Scores: BIMS 13/15 and PHQ9 00/27    Equipment: walker    DISCHARGE DIAGNOSIS:   1. CLL (chronic lymphocytic leukemia) (H)    2. Tumor lysis syndrome    3. Pleural effusion on right, small    4. Other hypervolemia, Iatorogenic    5. Other chronic pain    6. Bone pain, lower extremities    7. Neuropathy    8. KIANA (acute kidney injury) (H)    9. Chronic kidney disease, unspecified CKD stage    10. Chronic obstructive pulmonary disease, unspecified COPD type (H), moderate    11. Type 2 diabetes mellitus without complication, without long-term current use of insulin (H)    12. Factor V Leiden (H)    13. Debility        HPI Nursing Facility Course:  HPI information obtained from: facility chart records, facility staff, patient report and [facility] chart review.    Mr. Zapata is a complex 69 y/o with a PMH significant for DM II, Factor V Leiden, moderate COPD, and hyperparathyroidism whom also has CLL, current exacerbation [facility].  He has been on a Venclexta chemotherapy, noted to induce hyperglycemia, and has been  experiencing bone pain in LE's and shoulders, also noted to have iatrogenic fluid overload with small pleural effusion.  Was stable/improving thus transitioned to us at the TCU for ongoing cares and PT/OT.  Of note: [facility]. aZpata get's all [facility] the [facility], thus we have no EMR data/records on file for him.  Only the few notes sent via the [facility].     While at the TCU [facility].  He reports the VA stopped his Venclexta.  He has been slowly improving.  In regards to his COPD/pleural effusion, he reports respiratory status much improved, not currently requiring oxygen.  His iatrogenic fluid overload is resolved.  It was noted Venclexta can cause hyperglycemia, but since he stopped it, he has declined any glucose checks.  He has been more alert and did well from a PT/OT standpoint.  He was to 'DC home last week but due to the extreme weather, we waited, and now plans to 'DC some time this week, pending when he can get everything aligned.      In meeting with Mr. Zapata, he's been reporting he wants to return home for over a week. He denies any respiratory issues, reports his chronic pain improved with the new analgesic changes.  Reports ongoing bilateral hand and foot neuropathy as his biggest issue.  No other complaints.  Reports he have [facility] f/u on 2/7.      CLL (chronic lymphocytic leukemia) (H)  Tumor lysis syndrome  Reports the [facility] has him off of Venclexta (chemotherapy) for now.   When last reviewed, tumor lysis appeared stable.   -Labs per VA.   -Reports f/u on 2/7 with VA.   -Continues on Allopurinol.     Chronic obstructive pulmonary disease, unspecified COPD type (H), moderate  Pleural effusion on right, small  Does have mild RLL crackles on exam, but doing well on RA.  Denies any respiratory complaints.  Reports he has oxygen at home if needed, but has not needed here.   -Further monitoring per VA.   -Continues on daily Olodaterol and PRN Albuterol.     Other hypervolemia,  Iatorogenic  Did arrive to TCU on Furosemide but due to resolution of edema, improved weight, improved respiratory status, this was DC'd, considered resolved.   Weight was 196 lbs on admission to TCU, was down to 176.1 on 4 Feb.   -Further monitoring per VA.     Other chronic pain  Bone pain, lower extremities  Neuropathy  Ongoing but improved.  Seen by the [facility] on 31 Jan.   -They increased Gabapentin to 900 mg's TID.   -They increased Oxycodone to 10 mg's q4h PRN for pain.   -We continue Acetaminophen at 1000 mg's TID.   -Further monitoring per VA.     KIANA (acute kidney injury) (H)  Chronic kidney disease, unspecified CKD stage  We have limited labs from the VA, suspect creatine baseline is around 1.2.   -Further monitoring per VA.     Type 2 diabetes mellitus without complication, without long-term current use of insulin (H)  A1c noted to be 6.5 in January 2019.  Was started on Metformin 1000 mg's BID for DM II and the Venclexta.   Has not allowed glucose checks for us.  If no longer going to be on Venclexta, may need to reduce Metformin to prevent over treatment.   -VA to continue to monitor, consider reducing Metformin.     Factor V Leiden (H)  On Warfarin with erratic INR's.  We have continued his home dose of Warfarin 2.5 mg's on Mon and Thursday's and 5 mg's all other days.  INR has ranged 1.67 to 3.13 on this dose.    -Next INR due today 2/4, but not back yet due to weather conditions.   -Would recommend VA check INR 2/7 and take over management.     **ADDENDUM**  We increased Mr. Zapata's Warfarin to 2.5 mg's on Monday, 5 mg's all other days.   Next INR recommended 11 Feb.     Debility  -Will 'DC with home care.   -See below for F2F.     PAST MEDICAL HISTORY:  has no past medical history on file.    DISCHARGE MEDICATIONS:  Current Outpatient Medications   Medication Sig Dispense Refill     acetaminophen (TYLENOL) 325 MG tablet Take 1,000 mg by mouth 3 times daily       albuterol (PROAIR HFA/PROVENTIL  HFA/VENTOLIN HFA) 108 (90 Base) MCG/ACT inhaler Inhale 2 puffs into the lungs every 4 hours as needed for shortness of breath / dyspnea or wheezing       allopurinol (ZYLOPRIM) 300 MG tablet Take 300 mg by mouth daily       atorvastatin (LIPITOR) 40 MG tablet Take 40 mg by mouth daily       cholecalciferol 1000 units TABS Take 2,000 Units by mouth daily       cinacalcet (SENSIPAR) 30 MG tablet Take 30 mg by mouth every evening       diclofenac (VOLTAREN) 1 % topical gel Place 2 g onto the skin as needed for moderate pain (max 32grams per day)       gabapentin (NEURONTIN) 300 MG capsule Take 900 mg by mouth 3 times daily       loperamide (IMODIUM) 2 MG capsule Take 2 mg by mouth as needed for diarrhea (2 mg by mouth as needed for diarrhea daily, take two capsules at onset of diarrhea, then one capsule every two hours until no diarrhea for twelve hours)       Magnesium Oxide 140 MG CAPS Take 280 mg by mouth 3 times daily        metFORMIN (GLUCOPHAGE) 1000 MG tablet Take 1,000 mg by mouth 2 times daily (with meals)       oxyCODONE (ROXICODONE) 5 MG tablet Take 2 tablets (10 mg) by mouth every 4 hours as needed for pain 60 tablet 0     pantoprazole (PROTONIX) 40 MG EC tablet Take 40 mg by mouth daily       prochlorperazine (COMPAZINE) 10 MG tablet Take 10 mg by mouth every 6 hours as needed for nausea or vomiting       senna-docusate (SENOKOT-S/PERICOLACE) 8.6-50 MG tablet Take 1 tablet by mouth At Bedtime       tiotropium-olodaterol 2.5-2.5 MCG/ACT AERS Inhale 2 puffs into the lungs daily       venetoclax (VENCLEXTA) 100 MG tablet CHEMOTHERAPY Take 100 mg by mouth daily       warfarin (COUMADIN) 2.5 MG tablet Take by mouth daily Take as directed per INR         MEDICATION CHANGES/RATIONALE:   None    Controlled medications sent with patient:   Medication: PRN Oxycodone , approximately 60 tabs given to patient at the time of discharge to take home     ROS:    7 point ROS done including, light headedness/dizziness,  "fever/chills, pain, Resp, CV, GI, and  and is negative other than noted in HPI.      Physical Exam:   Vitals: /62   Pulse 65   Temp 98  F (36.7  C)   Resp 17   Ht 1.735 m (5' 8.3\")   Wt 81 kg (178 lb 9.6 oz)   SpO2 98%   BMI 26.92 kg/m    BMI= Body mass index is 26.92 kg/m .    GENERAL APPEARANCE:  Elderly male ambulating about room independently, NAD, non-toxic.      ENT:  Mouth and oropharynx normal, moist mucous membranes.   RESP:  Lungs with fine crackles in RLL, diminished in LLL, otherwise CTA.  Regular relaxed breathing effort.  No cough.   CV: 2/6 systolic murmur heard thoughout/S2.  Regular rhythm and rate.    ABDOMEN:   Protuberant but, soft, non-tender with active bowel sounds.  No guarding, rigidity, or rebound tenderness.  EXTREMITIES: No bilateral lower extremity edema, no calf tenderness.   PSYCH: Alert and orientated, pleasant and cooperative.     DISCHARGE PLAN:  Home with home health care.  Patient instructed to follow-up with:  PCP in 7 days      Current [place] scheduled appointments:  No future appointments.    MTM referral needed and placed by this provider: No    Pending labs:  None    SNF labs: None      Discharge Treatments: None    TOTAL DISCHARGE TIME:   Greater than 30 minutes     Electronically signed by:  SALLY Garcia CNP       Documentation of Face to Face and Certification for Home Health Services    I certify that patient: Herminio Zapata is under my care and that I, or a nurse practitioner or physician's assistant working with me, had a face-to-face encounter that meets the physician face-to-face encounter requirements with this patient on: 4 Feb 19.    This encounter with the patient was in whole, or in part, for the following medical condition, which is the primary reason for home health care: CLL, Debility.      I certify that, based on my findings, the following services are medically necessary home health services: Nursing, Occupational Therapy, Physical " Therapy and HHA.    My clinical findings support the need for the above services because: Nurse is needed: To provide assessment and oversight required in the home to assure adherence to the medical plan due to: Debility and CLL. .., Occupational Therapy Services are needed to assess and treat cognitive ability and address ADL safety due to impairment in Debility and CLL. . and Physical Therapy Services are needed to assess and treat the following functional impairments: Debility and CLL. .    Further, I certify that my clinical findings support that this patient is homebound (i.e. absences from home require considerable and taxing effort and are for medical reasons or [employer] services or infrequently or of short duration when for other reasons) because: Requires assistance of another person or specialized equipment to access medical services because patient: Requires supervision of another for safe transfer...    Based on the above findings. I certify that this patient is confined to the home and needs intermittent skilled nursing care, physical therapy and/or speech therapy.  The patient is under my care, and I have initiated the establishment of the plan of care.  This patient will be followed by a physician who will periodically review the plan of care.  Physician/Provider to provide follow up care: [facility], [facility]    Attending hospital physician (the Medicare certified JOSE A provider):   Electronically signed by  SALLY Daley CNP  [facility]    Physician Signature: See electronic signature associated with these discharge orders.  Date: 2/4/2019

## 2019-02-04 NOTE — LETTER
2/4/2019        RE: Herminio Zapata  [address]  [facility] [address]          [facility] DISCHARGE SUMMARY    PATIENT'S NAME: Herminio Zapata  YOB: 1950  MEDICAL RECORD NUMBER:  [id number]  Place of Service where encounter took place:  [facility] ([facility]) [[id number]]    PRIMARY CARE PROVIDER AND CLINIC RESPONSIBLE AFTER TRANSFER: [facility] [address] / [facility] [address]    TRANSFERRING PROVIDERS: SALLY Garcia CNP, Dr. Raquel MD  DATE OF SNF ADMISSION:  January / 10 / 2019  DATE OF SNF (anticipated) DISCHARGE: February / 06-09 (pt choice)/ 2019  DISCHARGE DISPOSITION: Non-FMG Provider   RECENT HOSPITALIZATION/ED:  [facility]  stay 1/5/19 to 1/10/19.     CODE STATUS/ADVANCE DIRECTIVES DISCUSSION:   DNR / DNI     Allergies   Allergen Reactions     Allopurinol      Cimetidine      Morphine      Condition on Discharge:  Stable.  Function:  250 ft with 2ww  Cognitive Scores: BIMS 13/15 and PHQ9 00/27    Equipment: walker    DISCHARGE DIAGNOSIS:   1. CLL (chronic lymphocytic leukemia) (H)    2. Tumor lysis syndrome    3. Pleural effusion on right, small    4. Other hypervolemia, Iatorogenic    5. Other chronic pain    6. Bone pain, lower extremities    7. Neuropathy    8. KIANA (acute kidney injury) (H)    9. Chronic kidney disease, unspecified CKD stage    10. Chronic obstructive pulmonary disease, unspecified COPD type (H), moderate    11. Type 2 diabetes mellitus without complication, without long-term current use of insulin (H)    12. Factor V Leiden (H)    13. Debility        HPI Nursing Facility Course:  HPI information obtained from: facility chart records, facility staff, patient report and [facility] chart review.    Mr. Zapata is a complex 69 y/o with a PMH significant for DM II, Factor V Leiden, moderate COPD, and hyperparathyroidism whom also has CLL, current exacerbation and [facility]  [facility].  He has been on a Venclexta chemotherapy, noted to induce hyperglycemia, and has been experiencing bone pain in LE's and shoulders, also noted to have iatrogenic fluid overload with small pleural effusion.  Was stable/improving thus transitioned to us at the TCU for ongoing cares and PT/OT.  Of note: Mr. Zapata get's all his care via the [facility], thus we have no EMR data/records on file for him.  Only the few notes sent via the VA.     While at the TCU Mr. Zapata went [facility] to the [facility] frequently/weekly.  He reports the VA stopped his Venclexta.  He has been slowly improving.  In regards to his COPD/pleural effusion, he reports respiratory status much improved, not currently requiring oxygen.  His iatrogenic fluid overload is resolved.  It was noted Venclexta can cause hyperglycemia, but since he stopped it, he has declined any glucose checks.  He has been more alert and did well from a PT/OT standpoint.  He was to 'DC home last week but due to the extreme weather, we waited, and now plans to 'DC some time this week, pending when he can get everything aligned.      In meeting with Mr. Zapata, he's been reporting he wants to return home for over a week. He denies any respiratory issues, reports his chronic pain improved with the new analgesic changes.  Reports ongoing bilateral hand and foot neuropathy as his biggest issue.  No other complaints.  Reports he have VA f/u on 2/7.      CLL (chronic lymphocytic leukemia) (H)  Tumor lysis syndrome  Reports the VA has him off of Venclexta (chemotherapy) for now.   When last reviewed, tumor lysis appeared stable.   -Labs per VA.   -Reports f/u on 2/7 with VA.   -Continues on Allopurinol.     Chronic obstructive pulmonary disease, unspecified COPD type (H), moderate  Pleural effusion on right, small  Does have mild RLL crackles on exam, but doing well on RA.  Denies any respiratory complaints.  Reports he has oxygen at home if needed, but has not needed here.   -Further monitoring per  VA.   -Continues on daily Olodaterol and PRN Albuterol.     Other hypervolemia, Iatorogenic  Did arrive to TCU on Furosemide but due to resolution of edema, improved weight, improved respiratory status, this was DC'd, considered resolved.   Weight was 196 lbs on admission to TCU, was down to 176.1 on 4 Feb.   -Further monitoring per VA.     Other chronic pain  Bone pain, lower extremities  Neuropathy  Ongoing but improved.  Seen by the VA on 31 Jan.   -They increased Gabapentin to 900 mg's TID.   -They increased Oxycodone to 10 mg's q4h PRN for pain.   -We continue Acetaminophen at 1000 mg's TID.   -Further monitoring per VA.     KIANA (acute kidney injury) (H)  Chronic kidney disease, unspecified CKD stage  We have limited labs from the VA, suspect creatine baseline is around 1.2.   -Further monitoring per VA.     Type 2 diabetes mellitus without complication, without long-term current use of insulin (H)  A1c noted to be 6.5 in January 2019.  Was started on Metformin 1000 mg's BID for DM II and the Venclexta.   Has not allowed glucose checks for us.  If no longer going to be on Venclexta, may need to reduce Metformin to prevent over treatment.   -VA to continue to monitor, consider reducing Metformin.     Factor V Leiden (H)  On Warfarin with erratic INR's.  We have continued his home dose of Warfarin 2.5 mg's on Mon and Thursday's and 5 mg's all other days.  INR has ranged 1.67 to 3.13 on this dose.    -Next INR due today 2/4, but not back yet due to weather conditions.   -Would recommend VA check INR 2/7 and take over management.     **ADDENDUM**  We increased Mr. Zapata's Warfarin to 2.5 mg's on Monday, 5 mg's all other days.   Next INR recommended 11 Feb.     Debility  -Will 'DC with home care.   -See below for F2F.     PAST MEDICAL HISTORY:  has no past medical history on file.    DISCHARGE MEDICATIONS:  Current Outpatient Medications   Medication Sig Dispense Refill     acetaminophen (TYLENOL) 325 MG tablet Take  1,000 mg by mouth 3 times daily       albuterol (PROAIR HFA/PROVENTIL HFA/VENTOLIN HFA) 108 (90 Base) MCG/ACT inhaler Inhale 2 puffs into the lungs every 4 hours as needed for shortness of breath / dyspnea or wheezing       allopurinol (ZYLOPRIM) 300 MG tablet Take 300 mg by mouth daily       atorvastatin (LIPITOR) 40 MG tablet Take 40 mg by mouth daily       cholecalciferol 1000 units TABS Take 2,000 Units by mouth daily       cinacalcet (SENSIPAR) 30 MG tablet Take 30 mg by mouth every evening       diclofenac (VOLTAREN) 1 % topical gel Place 2 g onto the skin as needed for moderate pain (max 32grams per day)       gabapentin (NEURONTIN) 300 MG capsule Take 900 mg by mouth 3 times daily       loperamide (IMODIUM) 2 MG capsule Take 2 mg by mouth as needed for diarrhea (2 mg by mouth as needed for diarrhea daily, take two capsules at onset of diarrhea, then one capsule every two hours until no diarrhea for twelve hours)       Magnesium Oxide 140 MG CAPS Take 280 mg by mouth 3 times daily        metFORMIN (GLUCOPHAGE) 1000 MG tablet Take 1,000 mg by mouth 2 times daily (with meals)       oxyCODONE (ROXICODONE) 5 MG tablet Take 2 tablets (10 mg) by mouth every 4 hours as needed for pain 60 tablet 0     pantoprazole (PROTONIX) 40 MG EC tablet Take 40 mg by mouth daily       prochlorperazine (COMPAZINE) 10 MG tablet Take 10 mg by mouth every 6 hours as needed for nausea or vomiting       senna-docusate (SENOKOT-S/PERICOLACE) 8.6-50 MG tablet Take 1 tablet by mouth At Bedtime       tiotropium-olodaterol 2.5-2.5 MCG/ACT AERS Inhale 2 puffs into the lungs daily       venetoclax (VENCLEXTA) 100 MG tablet CHEMOTHERAPY Take 100 mg by mouth daily       warfarin (COUMADIN) 2.5 MG tablet Take by mouth daily Take as directed per INR         MEDICATION CHANGES/RATIONALE:   None    Controlled medications sent with patient:   Medication: PRN Oxycodone , approximately 60 tabs given to patient at the time of discharge to take  "home     ROS:    7 point ROS done including, light headedness/dizziness, fever/chills, pain, Resp, CV, GI, and  and is negative other than noted in HPI.      Physical Exam:   Vitals: /62   Pulse 65   Temp 98  F (36.7  C)   Resp 17   Ht 1.735 m (5' 8.3\")   Wt 81 kg (178 lb 9.6 oz)   SpO2 98%   BMI 26.92 kg/m     BMI= Body mass index is 26.92 kg/m .    GENERAL APPEARANCE:  Elderly male ambulating about room independently, NAD, non-toxic.      ENT:  Mouth and oropharynx normal, moist mucous membranes.   RESP:  Lungs  with fine crackles in RLL, diminished in LLL, otherwise CTA.  Regular relaxed breathing effort.  No cough.   CV: 2/6 systolic murmur heard thoughout/S2.  Regular rhythm and rate.    ABDOMEN:   Protuberant but, soft, non-tender with active bowel sounds.  No guarding, rigidity, or rebound tenderness.  EXTREMITIES: No bilateral lower extremity edema, no calf tenderness.   PSYCH: Alert and orientated, pleasant and cooperative.     DISCHARGE PLAN:  Home with home health care.  Patient instructed to follow-up with:  PCP in 7 days      Current [place] scheduled appointments:  No future appointments.    MTM referral needed and placed by this provider: No    Pending labs:  None    SNF labs: None      Discharge Treatments: None    TOTAL DISCHARGE TIME:   Greater than 30 minutes     Electronically signed by:  SALLY Garcia CNP       Documentation of Face to Face and Certification for Home Health Services    I certify that patient: Herminio Zapata is under my care and that I, or a nurse practitioner or physician's assistant working with me, had a face-to-face encounter that meets the physician face-to-face encounter requirements with this patient on: 4 Feb 19.    This encounter with the patient was in whole, or in part, for the following medical condition, which is the primary reason for home health care: CLL, Debility.      I certify that, based on my findings, the following services are medically " necessary home health services: Nursing, Occupational Therapy, Physical Therapy and HHA.    My clinical findings support the need for the above services because: Nurse is needed: To provide assessment and oversight required in the home to assure adherence to the medical plan due to: Debility and CLL. .., Occupational Therapy Services are needed to assess and treat cognitive ability and address ADL safety due to impairment in Debility and CLL. . and Physical Therapy Services are needed to assess and treat the following functional impairments: Debility and CLL. .    Further, I certify that my clinical findings support that this patient is homebound (i.e. absences from home require considerable and taxing effort and are for medical reasons or [employer] services or infrequently or of short duration when for other reasons) because: Requires assistance of another person or specialized equipment to access medical services because patient: Requires supervision of another for safe transfer...    Based on the above findings. I certify that this patient is confined to the home and needs intermittent skilled nursing care, physical therapy and/or speech therapy.  The patient is under my care, and I have initiated the establishment of the plan of care.  This patient will be followed by a physician who will periodically review the plan of care.  Physician/Provider to provide follow up care: [facility], [facility]    Attending hospital physician (the Medicare certified JOSE A provider):   Electronically signed by  SALLY Daley CNP  [facility]    Physician Signature: See electronic signature associated with these discharge orders.  Date: 2/4/2019        Sincerely,        SALLY Garcia CNP

## 2019-02-04 NOTE — LETTER
2/4/2019        RE: Herminio Zapata  [address]  [facility] [address]          [place] [facility] DISCHARGE SUMMARY    PATIENT'S NAME: Herminio Zapata  YOB: 1950  MEDICAL RECORD NUMBER:  [id number]  Place of Service where encounter took place:  [facility] ([facility]) [[id number]]    PRIMARY CARE PROVIDER AND CLINIC RESPONSIBLE AFTER TRANSFER: [facility] [address] / [facility] [address]    TRANSFERRING PROVIDERS: SALLY Garcia CNP, Dr. Raquel MD  DATE OF SNF ADMISSION:  January / 10 / 2019  DATE OF SNF (anticipated) DISCHARGE: February / 06-09 (pt choice)/ 2019  DISCHARGE DISPOSITION: Non-[facility] Provider   RECENT HOSPITALIZATION/ED:  [facility]  stay 1/5/19 to 1/10/19.     CODE STATUS/ADVANCE DIRECTIVES DISCUSSION:   DNR / DNI     Allergies   Allergen Reactions     Allopurinol      Cimetidine      Morphine      Condition on Discharge:  Stable.  Function:  250 ft with 2ww  Cognitive Scores: BIMS 13/15 and PHQ9 00/27    Equipment: walker    DISCHARGE DIAGNOSIS:   1. CLL (chronic lymphocytic leukemia) (H)    2. Tumor lysis syndrome    3. Pleural effusion on right, small    4. Other hypervolemia, Iatorogenic    5. Other chronic pain    6. Bone pain, lower extremities    7. Neuropathy    8. KIANA (acute kidney injury) (H)    9. Chronic kidney disease, unspecified CKD stage    10. Chronic obstructive pulmonary disease, unspecified COPD type (H), moderate    11. Type 2 diabetes mellitus without complication, without long-term current use of insulin (H)    12. Factor V Leiden (H)    13. Debility        HPI Nursing Facility Course:  HPI information obtained from: facility chart records, facility staff, patient report and [facility] chart review.    Mr. Zapata is a complex 69 y/o with a PMH significant for DM II, Factor V Leiden, moderate COPD, and hyperparathyroidism whom also has CLL, current exacerbation and [facility]  [facility].  He has been on a Venclexta chemotherapy, noted to induce hyperglycemia, and has been experiencing bone pain in LE's and shoulders, also noted to have iatrogenic fluid overload with small pleural effusion.  Was stable/improving thus transitioned to us at the TCU for ongoing cares and PT/OT.  Of note: Mr. Zapata get's all his care via the [facility], thus we have no EMR data/records on file for him.  Only the few notes sent via the [facility].     While at the TCU Mr. Zapata [facility] to the [facility] frequently/weekly.  He reports the VA stopped his Venclexta.  He has been slowly improving.  In regards to his COPD/pleural effusion, he reports respiratory status much improved, not currently requiring oxygen.  His iatrogenic fluid overload is resolved.  It was noted Venclexta can cause hyperglycemia, but since he stopped it, he has declined any glucose checks.  He has been more alert and did well from a PT/OT standpoint.  He was to 'DC home last week but due to the extreme weather, we waited, and now plans to 'DC some time this week, pending when he can get everything aligned.      In meeting with Mr. Zapata, he's been reporting he wants to return home for over a week. He denies any respiratory issues, reports his chronic pain improved with the new analgesic changes.  Reports ongoing bilateral hand and foot neuropathy as his biggest issue.  No other complaints.  Reports he have VA f/u on 2/7.      CLL (chronic lymphocytic leukemia) (H)  Tumor lysis syndrome  Reports the VA has him off of Venclexta (chemotherapy) for now.   When last reviewed, tumor lysis appeared stable.   -Labs per VA.   -Reports f/u on 2/7 with VA.   -Continues on Allopurinol.     Chronic obstructive pulmonary disease, unspecified COPD type (H), moderate  Pleural effusion on right, small  Does have mild RLL crackles on exam, but doing well on RA.  Denies any respiratory complaints.  Reports he has oxygen at home if needed, but has not needed here.   -Further monitoring per  VA.   -Continues on daily Olodaterol and PRN Albuterol.     Other hypervolemia, Iatorogenic  Did arrive to TCU on Furosemide but due to resolution of edema, improved weight, improved respiratory status, this was DC'd, considered resolved.   Weight was 196 lbs on admission to TCU, was down to 176.1 on 4 Feb.   -Further monitoring per VA.     Other chronic pain  Bone pain, lower extremities  Neuropathy  Ongoing but improved.  Seen by the [facility] on 31 Jan.   -They increased Gabapentin to 900 mg's TID.   -They increased Oxycodone to 10 mg's q4h PRN for pain.   -We continue Acetaminophen at 1000 mg's TID.   -Further monitoring per VA.     KIANA (acute kidney injury) (H)  Chronic kidney disease, unspecified CKD stage  We have limited labs from the VA, suspect creatine baseline is around 1.2.   -Further monitoring per VA.     Type 2 diabetes mellitus without complication, without long-term current use of insulin (H)  A1c noted to be 6.5 in January 2019.  Was started on Metformin 1000 mg's BID for DM II and the Venclexta.   Has not allowed glucose checks for us.  If no longer going to be on Venclexta, may need to reduce Metformin to prevent over treatment.   -VA to continue to monitor, consider reducing Metformin.     Factor V Leiden (H)  On Warfarin with erratic INR's.  We have continued his home dose of Warfarin 2.5 mg's on Mon and Thursday's and 5 mg's all other days.  INR has ranged 1.67 to 3.13 on this dose.    -Next INR due today 2/4, but not back yet due to weather conditions.   -Would recommend VA check INR 2/7 and take over management.     Debility  -Will 'DC with home care.   -See below for F2F.     PAST MEDICAL HISTORY:  has no past medical history on file.    DISCHARGE MEDICATIONS:  Current Outpatient Medications   Medication Sig Dispense Refill     acetaminophen (TYLENOL) 325 MG tablet Take 1,000 mg by mouth 3 times daily       albuterol (PROAIR HFA/PROVENTIL HFA/VENTOLIN HFA) 108 (90 Base) MCG/ACT inhaler Inhale 2  puffs into the lungs every 4 hours as needed for shortness of breath / dyspnea or wheezing       allopurinol (ZYLOPRIM) 300 MG tablet Take 300 mg by mouth daily       atorvastatin (LIPITOR) 40 MG tablet Take 40 mg by mouth daily       cholecalciferol 1000 units TABS Take 2,000 Units by mouth daily       cinacalcet (SENSIPAR) 30 MG tablet Take 30 mg by mouth every evening       diclofenac (VOLTAREN) 1 % topical gel Place 2 g onto the skin as needed for moderate pain (max 32grams per day)       gabapentin (NEURONTIN) 300 MG capsule Take 900 mg by mouth 3 times daily       loperamide (IMODIUM) 2 MG capsule Take 2 mg by mouth as needed for diarrhea (2 mg by mouth as needed for diarrhea daily, take two capsules at onset of diarrhea, then one capsule every two hours until no diarrhea for twelve hours)       Magnesium Oxide 140 MG CAPS Take 280 mg by mouth 3 times daily        metFORMIN (GLUCOPHAGE) 1000 MG tablet Take 1,000 mg by mouth 2 times daily (with meals)       oxyCODONE (ROXICODONE) 5 MG tablet Take 2 tablets (10 mg) by mouth every 4 hours as needed for pain 60 tablet 0     pantoprazole (PROTONIX) 40 MG EC tablet Take 40 mg by mouth daily       prochlorperazine (COMPAZINE) 10 MG tablet Take 10 mg by mouth every 6 hours as needed for nausea or vomiting       senna-docusate (SENOKOT-S/PERICOLACE) 8.6-50 MG tablet Take 1 tablet by mouth At Bedtime       tiotropium-olodaterol 2.5-2.5 MCG/ACT AERS Inhale 2 puffs into the lungs daily       venetoclax (VENCLEXTA) 100 MG tablet CHEMOTHERAPY Take 100 mg by mouth daily       warfarin (COUMADIN) 2.5 MG tablet Take by mouth daily Take as directed per INR         MEDICATION CHANGES/RATIONALE:   None    Controlled medications sent with patient:   Medication: PRN Oxycodone , approximately 60 tabs given to patient at the time of discharge to take home     ROS:    7 point ROS done including, light headedness/dizziness, fever/chills, pain, Resp, CV, GI, and  and is negative other  "than noted in HPI.      Physical Exam:   Vitals: /62   Pulse 65   Temp 98  F (36.7  C)   Resp 17   Ht 1.735 m (5' 8.3\")   Wt 81 kg (178 lb 9.6 oz)   SpO2 98%   BMI 26.92 kg/m     BMI= Body mass index is 26.92 kg/m .    GENERAL APPEARANCE:  Elderly male ambulating about room independently, NAD, non-toxic.      ENT:  Mouth and oropharynx normal, moist mucous membranes.   RESP:  Lungs  with fine crackles in RLL, diminished in LLL, otherwise CTA.  Regular relaxed breathing effort.  No cough.   CV: 2/6 systolic murmur heard thoughout/S2.  Regular rhythm and rate.    ABDOMEN:   Protuberant but, soft, non-tender with active bowel sounds.  No guarding, rigidity, or rebound tenderness.  EXTREMITIES: No bilateral lower extremity edema, no calf tenderness.   PSYCH: Alert and orientated, pleasant and cooperative.     DISCHARGE PLAN:  Home with home health care.  Patient instructed to follow-up with:  PCP in 7 days      [facility] scheduled appointments:  No future appointments.    MTM referral needed and placed by this provider: No    Pending labs:  None    SNF labs: None      Discharge Treatments: None    TOTAL DISCHARGE TIME:   Greater than 30 minutes     Electronically signed by:  SALLY Garcia CNP       Documentation of Face to Face and Certification for Home Health Services    I certify that patient: Herminio Zapata is under my care and that I, or a nurse practitioner or physician's assistant working with me, had a face-to-face encounter that meets the physician face-to-face encounter requirements with this patient on: 4 Feb 19.    This encounter with the patient was in whole, or in part, for the following medical condition, which is the primary reason for home health care: CLL, Debility.      I certify that, based on my findings, the following services are medically necessary home health services: Nursing, Occupational Therapy, Physical Therapy and HHA.    My clinical findings support the need for the " above services because: Nurse is needed: To provide assessment and oversight required in the home to assure adherence to the medical plan due to: Debility and CLL. .., Occupational Therapy Services are needed to assess and treat cognitive ability and address ADL safety due to impairment in Debility and CLL. . and Physical Therapy Services are needed to assess and treat the following functional impairments: Debility and CLL. .    Further, I certify that my clinical findings support that this patient is homebound (i.e. absences from home require considerable and taxing effort and are for medical reasons or [facility] services or infrequently or of short duration when for other reasons) because: Requires assistance of another person or specialized equipment to access medical services because patient: Requires supervision of another for safe transfer...    Based on the above findings. I certify that this patient is confined to the home and needs intermittent skilled nursing care, physical therapy and/or speech therapy.  The patient is under my care, and I have initiated the establishment of the plan of care.  This patient will be followed by a physician who will periodically review the plan of care.  Physician/Provider to provide follow up care: [facility], [facility]    Attending hospital physician (the Medicare certified PECOS provider):   Electronically signed by  SALLY aDley CNP  [facility]    Physician Signature: See electronic signature associated with these discharge orders.  Date: 2/4/2019        Sincerely,        SALLY Garcia CNP

## 2019-02-05 ENCOUNTER — RECORDS - HEALTHEAST (OUTPATIENT)
Dept: LAB | Facility: CLINIC | Age: 69
End: 2019-02-05

## 2019-02-05 LAB — INR PPP: 1.68 (ref 0.9–1.1)

## 2019-02-08 ENCOUNTER — RECORDS - HEALTHEAST (OUTPATIENT)
Dept: LAB | Facility: CLINIC | Age: 69
End: 2019-02-08

## 2019-02-11 ENCOUNTER — NURSING HOME VISIT (OUTPATIENT)
Dept: GERIATRICS | Facility: CLINIC | Age: 69
End: 2019-02-11
Payer: MEDICARE

## 2019-02-11 DIAGNOSIS — D68.51 FACTOR V LEIDEN (H): ICD-10-CM

## 2019-02-11 DIAGNOSIS — G89.29 OTHER CHRONIC PAIN: ICD-10-CM

## 2019-02-11 DIAGNOSIS — E11.9 TYPE 2 DIABETES MELLITUS WITHOUT COMPLICATION, WITHOUT LONG-TERM CURRENT USE OF INSULIN (H): ICD-10-CM

## 2019-02-11 DIAGNOSIS — C91.10 CLL (CHRONIC LYMPHOCYTIC LEUKEMIA) (H): Primary | ICD-10-CM

## 2019-02-11 DIAGNOSIS — G62.9 NEUROPATHY: ICD-10-CM

## 2019-02-11 DIAGNOSIS — N18.9 CHRONIC KIDNEY DISEASE, UNSPECIFIED CKD STAGE: ICD-10-CM

## 2019-02-11 DIAGNOSIS — E88.3 TUMOR LYSIS SYNDROME: ICD-10-CM

## 2019-02-11 DIAGNOSIS — J44.9 CHRONIC OBSTRUCTIVE PULMONARY DISEASE, UNSPECIFIED COPD TYPE (H): ICD-10-CM

## 2019-02-11 DIAGNOSIS — M89.8X9 BONE PAIN: ICD-10-CM

## 2019-02-11 DIAGNOSIS — R53.81 DEBILITY: ICD-10-CM

## 2019-02-11 LAB — INR PPP: 1.79 (ref 0.9–1.1)

## 2019-02-11 PROCEDURE — 99309 SBSQ NF CARE MODERATE MDM 30: CPT | Performed by: NURSE PRACTITIONER

## 2019-02-11 RX ORDER — OXYCODONE HYDROCHLORIDE 5 MG/1
10 TABLET ORAL EVERY 4 HOURS PRN
Qty: 80 TABLET | Refills: 0 | Status: SHIPPED | OUTPATIENT
Start: 2019-02-11

## 2019-02-11 NOTE — PROGRESS NOTES
[place] [facility]    Chief Complaint   Patient presents with     JESSICA       [facility] Record Number:  [id number]  Place of Service where encounter took place:  [facility] ([facility]) [[id number]]    HPI:    Herminio Zapata is a 68 year old  (1950), who is being seen today for an episodic care visit.  HPI information obtained from: facility chart records, facility staff, patient report and [facility] chart review.    Met with Mr. Zapata this week for follow up.  Mr. Zapata was to 'DC home late last week, but got delayed due to weather, thus was to 'DC over the weekend.  'DC summary/visit done.  But today we are informed due to transportation issues, he still remains.  In visiting with Mr. Zapata, he continues to report he feels well, no acute changes, is hoping to get out today/tomorrow pending when he can get ride/transportation set up.      ALLERGIES: Allopurinol; Cimetidine; and Morphine  Past Medical, Surgical, Family and Social History reviewed and updated in [facility].    Current Outpatient Medications   Medication Sig Dispense Refill     acetaminophen (TYLENOL) 325 MG tablet Take 1,000 mg by mouth 3 times daily       albuterol (PROAIR HFA/PROVENTIL HFA/VENTOLIN HFA) 108 (90 Base) MCG/ACT inhaler Inhale 2 puffs into the lungs every 4 hours as needed for shortness of breath / dyspnea or wheezing       allopurinol (ZYLOPRIM) 300 MG tablet Take 300 mg by mouth daily       atorvastatin (LIPITOR) 40 MG tablet Take 40 mg by mouth daily       cholecalciferol 1000 units TABS Take 2,000 Units by mouth daily       cinacalcet (SENSIPAR) 30 MG tablet Take 30 mg by mouth every evening       diclofenac (VOLTAREN) 1 % topical gel Place 2 g onto the skin as needed for moderate pain (max 32grams per day)       gabapentin (NEURONTIN) 300 MG capsule Take 900 mg by mouth 3 times daily       loperamide (IMODIUM) 2 MG capsule Take 2 mg by mouth as needed for diarrhea (2 mg by mouth as needed for diarrhea daily, take two  capsules at onset of diarrhea, then one capsule every two hours until no diarrhea for twelve hours)       Magnesium Oxide 140 MG CAPS Take 280 mg by mouth 3 times daily        metFORMIN (GLUCOPHAGE) 1000 MG tablet Take 1,000 mg by mouth 2 times daily (with meals)       oxyCODONE (ROXICODONE) 5 MG tablet Take 2 tablets (10 mg) by mouth every 4 hours as needed for pain 80 tablet 0     pantoprazole (PROTONIX) 40 MG EC tablet Take 40 mg by mouth daily       prochlorperazine (COMPAZINE) 10 MG tablet Take 10 mg by mouth every 6 hours as needed for nausea or vomiting       senna-docusate (SENOKOT-S/PERICOLACE) 8.6-50 MG tablet Take 1 tablet by mouth At Bedtime       tiotropium-olodaterol 2.5-2.5 MCG/ACT AERS Inhale 2 puffs into the lungs daily       venetoclax (VENCLEXTA) 100 MG tablet CHEMOTHERAPY Take 100 mg by mouth daily       warfarin (COUMADIN) 2.5 MG tablet Take by mouth daily Take as directed per INR       Medications reviewed:  Medications reconciled to facility chart and changes were made to reflect current medications as identified as above med list. Below are the changes that were made:   Medications stopped since last EPIC medication reconciliation:   There are no discontinued medications.    Medications started since last [facility] medication reconciliation:  No orders of the defined types were placed in this encounter.    REVIEW OF SYSTEMS:  7 point ROS done including, light headedness/dizziness, fever/chills, pain, Resp, CV, GI, and  and is negative other than noted in HPI.      Physical Exam:  110/70, HR 70, 18RR, 96% on RA, 98.0 F.    GENERAL APPEARANCE:  Elderly male resting in bed, NAD, non-toxic.      ENT:  Mouth and oropharynx normal, moist mucous membranes.   RESP:  Lungs diminished bilateral lobes, otherwise CTA.  Regular relaxed breathing effort.  No cough.   CV: 2/6 systolic murmur heard thoughout/S2.  Regular rhythm and rate.    ABDOMEN:   Protuberant but, soft, non-tender with active bowel  sounds.  No guarding, rigidity, or rebound tenderness.  EXTREMITIES: No bilateral lower extremity edema, no calf tenderness.   PSYCH: Alert and orientated, pleasant and cooperative.     Recent Labs: None recent.     Assessment/Plan:  CLL (chronic lymphocytic leukemia) (H)  Tumor lysis syndrome  Other chronic pain  Bone pain, lower extremities  Neuropathy  [facility].  He reports he is currently off the chemotherapy Venclexta reports follow up later this week.     Chronic kidney disease, unspecified CKD stage  Followed by [facility].     Type 2 diabetes mellitus without complication, without long-term current use of insulin (H)  Declines glucose checks, last A1c was 7.3 in Jan 2018.   -Continues BID Metformin.     Chronic obstructive pulmonary disease, unspecified COPD type (H), moderate  No longer requiring oxygen, does have diminished to fine RLL crackles but slowly been improving, has not needed O2 for some time.   -No changes, continue to clinically monitor.     Factor V Leiden (H)  Continues on Warfarin.   Last week INR was slightly low at 1.68  INR done today 2/11, results pending.   -Will f/u with INR later today.   -Recommended he get INR checked with next [facility] visit.     Debility  -Done with therapy, awaiting 'DC.     Orders:  -INR orders to follow.     Electronically signed by  SALLY Garcia CNP

## 2019-02-11 NOTE — LETTER
2/11/2019        RE: Herminio Zapata  [address]  [facility] [address]        [place] [facility]    Chief Complaint   Patient presents with     RECHECK       [facility] Record Number:  [id number]  [facility] Service where encounter took place[facility] ([facility]) [[id number]]    HPI:    Herminio Zapata is a 68 year old  (1950), who is being seen today for an episodic care visit.  HPI information obtained from: facility chart records, facility staff, patient report and [facility] chart review.    Met with Mr. Zapata this week for follow up.  Mr. Zapata was to 'DC home late last week, but got delayed due to weather, thus was to 'DC over the weekend.  'DC summary/visit done.  But today we are informed due to transportation issues, he still remains.  In visiting with Mr. Zapata, he continues to report he feels well, no acute changes, is hoping to get out today/tomorrow pending when he can get ride/transportation set up.      ALLERGIES: Allopurinol; Cimetidine; and Morphine  Past Medical, Surgical, Family and Social History reviewed and updated in [facility].    Current Outpatient Medications   Medication Sig Dispense Refill     acetaminophen (TYLENOL) 325 MG tablet Take 1,000 mg by mouth 3 times daily       albuterol (PROAIR HFA/PROVENTIL HFA/VENTOLIN HFA) 108 (90 Base) MCG/ACT inhaler Inhale 2 puffs into the lungs every 4 hours as needed for shortness of breath / dyspnea or wheezing       allopurinol (ZYLOPRIM) 300 MG tablet Take 300 mg by mouth daily       atorvastatin (LIPITOR) 40 MG tablet Take 40 mg by mouth daily       cholecalciferol 1000 units TABS Take 2,000 Units by mouth daily       cinacalcet (SENSIPAR) 30 MG tablet Take 30 mg by mouth every evening       diclofenac (VOLTAREN) 1 % topical gel Place 2 g onto the skin as needed for moderate pain (max 32grams per day)       gabapentin (NEURONTIN) 300 MG capsule Take 900 mg by mouth 3 times daily       loperamide (IMODIUM) 2 MG capsule Take 2  mg by mouth as needed for diarrhea (2 mg by mouth as needed for diarrhea daily, take two capsules at onset of diarrhea, then one capsule every two hours until no diarrhea for twelve hours)       Magnesium Oxide 140 MG CAPS Take 280 mg by mouth 3 times daily        metFORMIN (GLUCOPHAGE) 1000 MG tablet Take 1,000 mg by mouth 2 times daily (with meals)       oxyCODONE (ROXICODONE) 5 MG tablet Take 2 tablets (10 mg) by mouth every 4 hours as needed for pain 80 tablet 0     pantoprazole (PROTONIX) 40 MG EC tablet Take 40 mg by mouth daily       prochlorperazine (COMPAZINE) 10 MG tablet Take 10 mg by mouth every 6 hours as needed for nausea or vomiting       senna-docusate (SENOKOT-S/PERICOLACE) 8.6-50 MG tablet Take 1 tablet by mouth At Bedtime       tiotropium-olodaterol 2.5-2.5 MCG/ACT AERS Inhale 2 puffs into the lungs daily       venetoclax (VENCLEXTA) 100 MG tablet CHEMOTHERAPY Take 100 mg by mouth daily       warfarin (COUMADIN) 2.5 MG tablet Take by mouth daily Take as directed per INR       Medications reviewed:  Medications reconciled to facility chart and changes were made to reflect current medications as identified as above med list. Below are the changes that were made:   Medications stopped since last EPIC medication reconciliation:   There are no discontinued medications.    Medications started since last [facility] medication reconciliation:  No orders of the defined types were placed in this encounter.    REVIEW OF SYSTEMS:  7 point ROS done including, light headedness/dizziness, fever/chills, pain, Resp, CV, GI, and  and is negative other than noted in HPI.      Physical Exam:  110/70, HR 70, 18RR, 96% on RA, 98.0 F.    GENERAL APPEARANCE:  Elderly male resting in bed, NAD, non-toxic.      ENT:  Mouth and oropharynx normal, moist mucous membranes.   RESP:  Lungs diminished bilateral lobes, otherwise CTA.  Regular relaxed breathing effort.  No cough.   CV: 2/6 systolic murmur heard thoughout/S2.  Regular  rhythm and rate.    ABDOMEN:   Protuberant but, soft, non-tender with active bowel sounds.  No guarding, rigidity, or rebound tenderness.  EXTREMITIES: No bilateral lower extremity edema, no calf tenderness.   PSYCH: Alert and orientated, pleasant and cooperative.     Recent Labs: None recent.     Assessment/Plan:  CLL (chronic lymphocytic leukemia) (H)  Tumor lysis syndrome  Other chronic pain  Bone pain, lower extremities  Neuropathy  [facility].  He reports he is currently off the chemotherapy Venclexta reports follow up later this week.     Chronic kidney disease, unspecified CKD stage  Followed by [facility].     Type 2 diabetes mellitus without complication, without long-term current use of insulin (H)  Declines glucose checks, last A1c was 7.3 in Jan 2018.   -Continues BID Metformin.     Chronic obstructive pulmonary disease, unspecified COPD type (H), moderate  No longer requiring oxygen, does have diminished to fine RLL crackles but slowly been improving, has not needed O2 for some time.   -No changes, continue to clinically monitor.     Factor V Leiden (H)  Continues on Warfarin.   Last week INR was slightly low at 1.68  INR done today 2/11, results pending.   -Will f/u with INR later today.   -Recommended he get INR checked with next [facility] visit.     Debility  -Done with therapy, awaiting 'DC.     Orders:  -INR orders to follow.     Electronically signed by  SALLY Garcia CNP                      Sincerely,        SALLY Garcia CNP

## 2019-02-24 ENCOUNTER — MEDICAL CORRESPONDENCE (OUTPATIENT)
Dept: HEALTH INFORMATION MANAGEMENT | Facility: CLINIC | Age: 69
End: 2019-02-24